# Patient Record
Sex: FEMALE | Race: WHITE | Employment: FULL TIME | URBAN - METROPOLITAN AREA
[De-identification: names, ages, dates, MRNs, and addresses within clinical notes are randomized per-mention and may not be internally consistent; named-entity substitution may affect disease eponyms.]

---

## 2019-07-10 ENCOUNTER — OFFICE VISIT (OUTPATIENT)
Dept: FAMILY MEDICINE CLINIC | Age: 44
End: 2019-07-10
Payer: COMMERCIAL

## 2019-07-10 VITALS
BODY MASS INDEX: 33.2 KG/M2 | TEMPERATURE: 97.5 F | WEIGHT: 194.5 LBS | HEIGHT: 64 IN | HEART RATE: 75 BPM | SYSTOLIC BLOOD PRESSURE: 118 MMHG | OXYGEN SATURATION: 97 % | DIASTOLIC BLOOD PRESSURE: 68 MMHG

## 2019-07-10 DIAGNOSIS — M54.50 CHRONIC RIGHT-SIDED LOW BACK PAIN WITHOUT SCIATICA: Primary | ICD-10-CM

## 2019-07-10 DIAGNOSIS — Z13.31 POSITIVE DEPRESSION SCREENING: ICD-10-CM

## 2019-07-10 DIAGNOSIS — R32 URINARY INCONTINENCE, UNSPECIFIED TYPE: ICD-10-CM

## 2019-07-10 DIAGNOSIS — H61.21 IMPACTED CERUMEN, RIGHT EAR: ICD-10-CM

## 2019-07-10 DIAGNOSIS — F32.A DEPRESSION, UNSPECIFIED DEPRESSION TYPE: ICD-10-CM

## 2019-07-10 DIAGNOSIS — G89.29 CHRONIC RIGHT-SIDED LOW BACK PAIN WITHOUT SCIATICA: Primary | ICD-10-CM

## 2019-07-10 PROCEDURE — 99204 OFFICE O/P NEW MOD 45 MIN: CPT | Performed by: NURSE PRACTITIONER

## 2019-07-10 PROCEDURE — G8431 POS CLIN DEPRES SCRN F/U DOC: HCPCS | Performed by: NURSE PRACTITIONER

## 2019-07-10 PROCEDURE — G0444 DEPRESSION SCREEN ANNUAL: HCPCS | Performed by: NURSE PRACTITIONER

## 2019-07-10 PROCEDURE — 96372 THER/PROPH/DIAG INJ SC/IM: CPT | Performed by: NURSE PRACTITIONER

## 2019-07-10 RX ORDER — PREDNISONE 10 MG/1
10 TABLET ORAL
Qty: 15 TABLET | Refills: 0 | Status: SHIPPED | OUTPATIENT
Start: 2019-07-10 | End: 2019-07-15

## 2019-07-10 RX ORDER — CYCLOBENZAPRINE HCL 5 MG
5 TABLET ORAL 3 TIMES DAILY PRN
Qty: 10 TABLET | Refills: 0 | Status: SHIPPED | OUTPATIENT
Start: 2019-07-10 | End: 2019-07-20

## 2019-07-10 RX ORDER — KETOROLAC TROMETHAMINE 30 MG/ML
60 INJECTION, SOLUTION INTRAMUSCULAR; INTRAVENOUS ONCE
Status: COMPLETED | OUTPATIENT
Start: 2019-07-10 | End: 2019-07-10

## 2019-07-10 RX ORDER — LEVOTHYROXINE SODIUM 0.2 MG/1
200 TABLET ORAL DAILY
COMMUNITY
End: 2019-08-01 | Stop reason: SDUPTHER

## 2019-07-10 RX ADMIN — KETOROLAC TROMETHAMINE 60 MG: 30 INJECTION, SOLUTION INTRAMUSCULAR; INTRAVENOUS at 15:12

## 2019-07-10 ASSESSMENT — PATIENT HEALTH QUESTIONNAIRE - PHQ9
8. MOVING OR SPEAKING SO SLOWLY THAT OTHER PEOPLE COULD HAVE NOTICED. OR THE OPPOSITE, BEING SO FIGETY OR RESTLESS THAT YOU HAVE BEEN MOVING AROUND A LOT MORE THAN USUAL: 3
6. FEELING BAD ABOUT YOURSELF - OR THAT YOU ARE A FAILURE OR HAVE LET YOURSELF OR YOUR FAMILY DOWN: 0
2. FEELING DOWN, DEPRESSED OR HOPELESS: 2
SUM OF ALL RESPONSES TO PHQ9 QUESTIONS 1 & 2: 4
1. LITTLE INTEREST OR PLEASURE IN DOING THINGS: 2
4. FEELING TIRED OR HAVING LITTLE ENERGY: 3
SUM OF ALL RESPONSES TO PHQ QUESTIONS 1-9: 16
10. IF YOU CHECKED OFF ANY PROBLEMS, HOW DIFFICULT HAVE THESE PROBLEMS MADE IT FOR YOU TO DO YOUR WORK, TAKE CARE OF THINGS AT HOME, OR GET ALONG WITH OTHER PEOPLE: 1
9. THOUGHTS THAT YOU WOULD BE BETTER OFF DEAD, OR OF HURTING YOURSELF: 0
5. POOR APPETITE OR OVEREATING: 0
SUM OF ALL RESPONSES TO PHQ QUESTIONS 1-9: 16
3. TROUBLE FALLING OR STAYING ASLEEP: 3
7. TROUBLE CONCENTRATING ON THINGS, SUCH AS READING THE NEWSPAPER OR WATCHING TELEVISION: 3

## 2019-07-10 NOTE — PROGRESS NOTES
Debrox drops 2 times daily      Return in about 1 month (around 8/10/2019) for chronic right sided back pain. 1.  Shilpa Mckeon received counseling on the following healthy behaviors: nutrition, exercise and medication adherence  2. Patient given educational materials - see patient instructions  3. Was a self-tracking handout given in paper form or via IQumulushart? No  If yes, see orders or list here. 4.  Discussed use, benefit, and side effects of prescribed medications. Barriers to medication compliance addressed. All patient questions answered. Pt voiced understanding. 5.  Reviewed prior labs, imaging, consultation, follow up, and health maintenance  6. Continue current medications, diet and exercise. 7. Discussed use, benefit, and side effects of prescribed medications. Barriers to medication compliance addressed. All her questions were answered. Pt voiced understanding. Shilpa Mckeon will continue current medications, diet and exercise. Patient given educational materials on back exercises/stretches    Of the 45 minute duration appointment visit, Breana Danielson CNP spent at least 50% of the face-to-face time in counseling, explanation of diagnosis, planning of further management, and answering all questions. Signed:  Breana Danielson CNP    This note is created with the assistance of a speech-recognition program.  While intending to generate a document that actually reflects the content of the visit, no guarantees can be provided that every mistake has been identified and corrected by editing.

## 2019-07-15 DIAGNOSIS — F32.A DEPRESSION, UNSPECIFIED DEPRESSION TYPE: Primary | ICD-10-CM

## 2019-07-15 DIAGNOSIS — Z13.31 POSITIVE DEPRESSION SCREENING: ICD-10-CM

## 2019-08-01 DIAGNOSIS — F32.A DEPRESSION, UNSPECIFIED DEPRESSION TYPE: ICD-10-CM

## 2019-08-01 DIAGNOSIS — Z13.220 SCREENING CHOLESTEROL LEVEL: ICD-10-CM

## 2019-08-01 DIAGNOSIS — E03.9 HYPOTHYROIDISM, UNSPECIFIED TYPE: Primary | ICD-10-CM

## 2019-08-02 RX ORDER — LEVOTHYROXINE SODIUM 0.2 MG/1
200 TABLET ORAL DAILY
Qty: 30 TABLET | Refills: 3 | Status: SHIPPED | OUTPATIENT
Start: 2019-08-02 | End: 2019-10-19 | Stop reason: DRUGHIGH

## 2019-10-12 LAB
ALBUMIN SERPL-MCNC: 4 G/DL
ALP BLD-CCNC: 83 U/L
ALT SERPL-CCNC: 11 U/L
ANION GAP SERPL CALCULATED.3IONS-SCNC: 9 MMOL/L
AST SERPL-CCNC: 12 U/L
BASOPHILS ABSOLUTE: NORMAL /ΜL
BASOPHILS RELATIVE PERCENT: NORMAL %
BILIRUB SERPL-MCNC: 0.6 MG/DL (ref 0.1–1.4)
BUN BLDV-MCNC: 14 MG/DL
CALCIUM SERPL-MCNC: 8.9 MG/DL
CHLORIDE BLD-SCNC: 108 MMOL/L
CHOLESTEROL, FASTING: 175
CO2: 24 MMOL/L
CREAT SERPL-MCNC: 0.88 MG/DL
EOSINOPHILS ABSOLUTE: NORMAL /ΜL
EOSINOPHILS RELATIVE PERCENT: NORMAL %
GFR CALCULATED: >60
GLUCOSE BLD-MCNC: 94 MG/DL
HCT VFR BLD CALC: 41.2 % (ref 36–46)
HDLC SERPL-MCNC: 34 MG/DL (ref 35–70)
HEMOGLOBIN: 13.8 G/DL (ref 12–16)
LDL CHOLESTEROL CALCULATED: 108 MG/DL (ref 0–160)
LYMPHOCYTES ABSOLUTE: NORMAL /ΜL
LYMPHOCYTES RELATIVE PERCENT: NORMAL %
MCH RBC QN AUTO: 30.2 PG
MCHC RBC AUTO-ENTMCNC: 33.6 G/DL
MCV RBC AUTO: 90 FL
MONOCYTES ABSOLUTE: NORMAL /ΜL
MONOCYTES RELATIVE PERCENT: NORMAL %
NEUTROPHILS ABSOLUTE: NORMAL /ΜL
NEUTROPHILS RELATIVE PERCENT: NORMAL %
PLATELET # BLD: 194 K/ΜL
PMV BLD AUTO: 7.6 FL
POTASSIUM SERPL-SCNC: 4.1 MMOL/L
RBC # BLD: 4.58 10^6/ΜL
SODIUM BLD-SCNC: 141 MMOL/L
T4 FREE: 1.76
TOTAL PROTEIN: 6.8
TRIGLYCERIDE, FASTING: 167
TSH SERPL DL<=0.05 MIU/L-ACNC: 0.06 UIU/ML
WBC # BLD: 3.8 10^3/ML

## 2019-10-19 DIAGNOSIS — F32.A DEPRESSION, UNSPECIFIED DEPRESSION TYPE: ICD-10-CM

## 2019-10-19 DIAGNOSIS — E03.9 HYPOTHYROIDISM, UNSPECIFIED TYPE: Primary | ICD-10-CM

## 2019-10-19 DIAGNOSIS — Z13.220 SCREENING CHOLESTEROL LEVEL: ICD-10-CM

## 2019-10-19 DIAGNOSIS — E03.9 HYPOTHYROIDISM, UNSPECIFIED TYPE: ICD-10-CM

## 2019-10-19 RX ORDER — LEVOTHYROXINE SODIUM 175 UG/1
175 TABLET ORAL DAILY
Qty: 30 TABLET | Refills: 0 | Status: SHIPPED | OUTPATIENT
Start: 2019-10-19 | End: 2020-04-06 | Stop reason: SDUPTHER

## 2020-04-06 RX ORDER — LEVOTHYROXINE SODIUM 0.2 MG/1
TABLET ORAL
Qty: 30 TABLET | Refills: 0 | OUTPATIENT
Start: 2020-04-06

## 2020-04-06 RX ORDER — LEVOTHYROXINE SODIUM 175 UG/1
175 TABLET ORAL DAILY
Qty: 30 TABLET | Refills: 0 | Status: SHIPPED | OUTPATIENT
Start: 2020-04-06 | End: 2020-05-04

## 2020-06-01 ENCOUNTER — HOSPITAL ENCOUNTER (OUTPATIENT)
Age: 45
Setting detail: SPECIMEN
Discharge: HOME | End: 2020-06-01
Payer: COMMERCIAL

## 2020-06-01 LAB
THYROXINE, FREE: 1.77 NG/DL (ref 0.93–1.7)
TSH SERPL DL<=0.05 MIU/L-ACNC: 0.27 MIU/L (ref 0.3–5)

## 2020-06-01 RX ORDER — LEVOTHYROXINE SODIUM 0.15 MG/1
150 TABLET ORAL DAILY
Qty: 30 TABLET | Refills: 1 | Status: SHIPPED | OUTPATIENT
Start: 2020-06-01 | End: 2020-10-27

## 2020-10-22 RX ORDER — LEVOTHYROXINE SODIUM 0.15 MG/1
TABLET ORAL
Qty: 30 TABLET | Refills: 0 | OUTPATIENT
Start: 2020-10-22

## 2020-10-28 ENCOUNTER — HOSPITAL ENCOUNTER (OUTPATIENT)
Age: 45
Discharge: HOME | End: 2020-10-28
Payer: COMMERCIAL

## 2020-10-28 LAB
THYROXINE, FREE: 0.95 NG/DL (ref 0.93–1.7)
TSH SERPL DL<=0.05 MIU/L-ACNC: 69.15 MIU/L (ref 0.3–5)

## 2020-10-28 PROCEDURE — 84443 ASSAY THYROID STIM HORMONE: CPT

## 2020-10-28 PROCEDURE — 84439 ASSAY OF FREE THYROXINE: CPT

## 2020-11-03 ENCOUNTER — OFFICE VISIT (OUTPATIENT)
Dept: FAMILY MEDICINE CLINIC | Age: 45
End: 2020-11-03
Payer: COMMERCIAL

## 2020-11-03 VITALS
BODY MASS INDEX: 33.46 KG/M2 | SYSTOLIC BLOOD PRESSURE: 110 MMHG | TEMPERATURE: 97.4 F | OXYGEN SATURATION: 95 % | DIASTOLIC BLOOD PRESSURE: 70 MMHG | WEIGHT: 196 LBS | HEIGHT: 64 IN | HEART RATE: 78 BPM

## 2020-11-03 PROCEDURE — G0444 DEPRESSION SCREEN ANNUAL: HCPCS | Performed by: NURSE PRACTITIONER

## 2020-11-03 PROCEDURE — 99396 PREV VISIT EST AGE 40-64: CPT | Performed by: NURSE PRACTITIONER

## 2020-11-03 RX ORDER — LEVOTHYROXINE SODIUM 175 UG/1
175 TABLET ORAL DAILY
Qty: 30 TABLET | Refills: 1 | Status: SHIPPED | OUTPATIENT
Start: 2020-11-03 | End: 2021-02-15 | Stop reason: SDUPTHER

## 2020-11-03 RX ORDER — FLUOXETINE HYDROCHLORIDE 40 MG/1
40 CAPSULE ORAL DAILY
COMMUNITY

## 2020-11-03 RX ORDER — BUSPIRONE HYDROCHLORIDE 10 MG/1
10 TABLET ORAL 3 TIMES DAILY
COMMUNITY

## 2020-11-03 SDOH — ECONOMIC STABILITY: INCOME INSECURITY: HOW HARD IS IT FOR YOU TO PAY FOR THE VERY BASICS LIKE FOOD, HOUSING, MEDICAL CARE, AND HEATING?: NOT HARD AT ALL

## 2020-11-03 SDOH — ECONOMIC STABILITY: FOOD INSECURITY: WITHIN THE PAST 12 MONTHS, THE FOOD YOU BOUGHT JUST DIDN'T LAST AND YOU DIDN'T HAVE MONEY TO GET MORE.: NEVER TRUE

## 2020-11-03 SDOH — ECONOMIC STABILITY: FOOD INSECURITY: WITHIN THE PAST 12 MONTHS, YOU WORRIED THAT YOUR FOOD WOULD RUN OUT BEFORE YOU GOT MONEY TO BUY MORE.: NEVER TRUE

## 2020-11-03 ASSESSMENT — PATIENT HEALTH QUESTIONNAIRE - PHQ9
1. LITTLE INTEREST OR PLEASURE IN DOING THINGS: 0
2. FEELING DOWN, DEPRESSED OR HOPELESS: 0
SUM OF ALL RESPONSES TO PHQ QUESTIONS 1-9: 0
SUM OF ALL RESPONSES TO PHQ9 QUESTIONS 1 & 2: 0

## 2020-11-03 NOTE — PROGRESS NOTES
CHUY Arnold-C  [address]  [id number] [facility]. Sara Walker  [address]  [phone]  [phone]    Dahiana Lamar is a 39 y.o. female who is here with c/o of:    Chief Complaint: Hypothyroidism      Patient Accompanied by: n/a    HPI - Dahiana Lamar is here today for well adult exam and f/u of chronic conditions    Anxiety   - patient is under the care of provider at [facility] for anxiety   - she reports her anxiety is controlled at this time   - she denies feeling down, depressed, or hopeless and no thoughts of harming herself   - current meds: buspar 10mg tid, Prozac 40mg daily and denies any adverse affects of the medications    Hypothyroid   - she denies fatigue, unintentional weight loss/gain, throat swelling/mass, difficulty swallowing, heat/cold intolerance, hair loss   - she is taking levothyroxine 175mcg daily and does not miss doses    HM  Dr. Anna Metz (gynecology) for Pap -    There is no problem list on file for this patient. Past Medical History:   Diagnosis Date    Allergic rhinitis     Anxiety     Depression     Hypothyroid     Irritable bowel syndrome       Past Surgical History:   Procedure Laterality Date    ENDOMETRIAL ABLATION      HYSTERECTOMY      OTHER SURGICAL HISTORY Right 08/09/2016    excision of axillary soft tissue mass with biopsy    THYROID SURGERY      benign mass    WRIST SURGERY      rt wrist tendon repair     Family History   Problem Relation Age of Onset    Diabetes Mother     Heart Disease Mother     High Blood Pressure Mother     High Cholesterol Mother     Depression Mother     High Blood Pressure Father     High Cholesterol Father     Alcohol Abuse Father     Asthma Father     High Blood Pressure Brother      Social History     Tobacco Use    Smoking status: Never Smoker    Smokeless tobacco: Never Used   Substance Use Topics    Alcohol use:  Yes     ALLERGIES:    Allergies   Allergen Reactions    Percocet [Oxycodone-Acetaminophen]           Subjective     · Constitutional:  Negative for activity change, appetite change,unexpected weight change, chills, fever, and fatigue. · HENT: Negative for ear pain, sore throat,  Rhinorrhea, sinus pain, sinus pressure, congestion. · Eyes:  Negative for pain and discharge. · Respiratory:  Negative for chest tightness, shortness of breath, wheezing, and cough. · Cardiovascular:  Negative for chest pain, palpitations and leg swelling. · Gastrointestinal: Negative for abdominal pain, blood in stool, constipation,diarrhea, nausea and vomiting. · Endocrine: Negative for cold intolerance, heat intolerance, polydipsia, polyphagia and polyuria. · Genitourinary: Negative for difficulty urinating, dysuria, flank pain, frequency, hematuria and urgency. · Musculoskeletal: Negative for arthralgias, back pain, joint swelling, myalgias, neck pain and neck stiffness. · Skin: Negative for rash and wound. · Allergic/Immunologic: Negative for environmental allergies and food allergies. · Neurological:  Negative for dizziness, light-headedness, numbness and headaches. · Hematological:  Negative for adenopathy. Does not bruise/bleed easily. · Psychiatric/Behavioral: Negative for self-injury, sleep disturbance and suicidal ideas. Positive for anxiety    Objective     PHYSICAL EXAM:   · Constitutional: Mendel Singleton is oriented to person, place, and time. Vital signs are normal. Appears well-developed and well-nourished. · HEENT:   · Head: Normocephalic and atraumatic. Right Ear: Hearing and external ear normal. TM normal  Canal normal  · Left Ear: Hearing and external ear normal. TM normal Canal normal  · Eyes:PERRL, EOMI, Conjunctiva normal, No discharge. · Neck: Full passive range of motion. Non-tender on palpation. Neck supple. No thyromegaly present.  Trachea normal.  · Cardiovascular: Normal rate, regular rhythm, S1, S2, no murmur, no gallop, no friction rub, intact distal pulses. · Pulmonary/Chest: Breath sounds are clear throughout, No respiratory distress, No wheezing, No chest tenderness. Effort normal  · Musculoskeletal: Extremities appear regular and symmetric. No evident masses, lesions, foreign bodies, or other abnormalities. No edema. No tenderness on palpation. Joints are stable. Full ROM, strength and tone are within normal limits. · Lymphadenopathy: No lymphadenopathy noted. · Neurological: Alert and oriented to person, place, and time. Normal motor function, Normal sensory function, No focal deficits noted. He has normal strength. · Skin: Skin is warm, dry and intact. No obvious lesions on exposed skin  · Psychiatric: Normal mood and affect. Speech is normal and behavior is normal.     Nursing note and vitals reviewed. Blood pressure 110/70, pulse 78, temperature 97.4 °F (36.3 °C), temperature source Temporal, height 5' 4\" (1.626 m), weight 196 lb (88.9 kg), SpO2 95 %. Body mass index is 33.64 kg/m². Wt Readings from Last 3 Encounters:   11/03/20 196 lb (88.9 kg)   07/10/19 194 lb 8 oz (88.2 kg)   08/09/16 186 lb (84.4 kg)     BP Readings from Last 3 Encounters:   11/03/20 110/70   07/10/19 118/68   08/09/16 110/75       No results found for this visit on 11/03/20. Completed Orders/Prescriptions   Orders Placed This Encounter   Medications    levothyroxine (SYNTHROID) 175 MCG tablet     Sig: Take 1 tablet by mouth Daily     Dispense:  30 tablet     Refill:  1               AssessmentPlan/Medical Decision Making     1. Well adult exam    2. Hypothyroidism, unspecified type  - levothyroxine (SYNTHROID) 175 MCG tablet; Take 1 tablet by mouth Daily  Dispense: 30 tablet; Refill: 1  - TSH; Future  - T4, Free; Future  - CBC With Auto Differential; Future  - Comprehensive Metabolic Panel; Future    3. Anxiety  - continue medications/f/u with [facility] as scheduled  - CBC With Auto Differential; Future  - Comprehensive Metabolic Panel; Future    4.  Depression screening negative  - [place] DEPRESSION SCREEN ANNUAL  PHQ-9 Total Score: 0 (11/3/2020  8:32 AM)        Return in about 1 year (around 11/3/2021). 1.  Fredrick New received counseling on the following healthy behaviors: nutrition, exercise and medication adherence  2. Patient given educational materials - see patient instructions  3. Was a self-tracking handout given in paper form or via Rhapsodyhart? No  If yes, see orders or list here. 4.  Discussed use, benefit, and side effects of prescribed medications. Barriers to medication compliance addressed. All patient questions answered. Pt voiced understanding. 5.  Reviewed prior labs, imaging, consultation, follow up, and health maintenance  6. Continue current medications, diet and exercise. 7. Discussed use, benefit, and side effects of prescribed medications. Barriers to medication compliance addressed. All her questions were answered. Pt voiced understanding. Fredrick New will continue current medications, diet and exercise. Of the 25 minute duration appointment visit, Summer Bradford CNP spent at least 50% of the face-to-face time in counseling, explanation of diagnosis, planning of further management, and answering all questions. Signed:  Summer Bradford CNP    This note is created with the assistance of a speech-recognition program.  While intending to generate a document that actually reflects the content of the visit, no guarantees can be provided that every mistake has been identified and corrected by editing.

## 2021-02-10 ENCOUNTER — HOSPITAL ENCOUNTER (OUTPATIENT)
Age: 46
Setting detail: SPECIMEN
Discharge: HOME | End: 2021-02-10
Payer: COMMERCIAL

## 2021-02-10 DIAGNOSIS — Z13.220 SCREENING CHOLESTEROL LEVEL: ICD-10-CM

## 2021-02-10 DIAGNOSIS — F41.9 ANXIETY: ICD-10-CM

## 2021-02-10 DIAGNOSIS — E03.9 HYPOTHYROIDISM, UNSPECIFIED TYPE: ICD-10-CM

## 2021-02-10 LAB
ABSOLUTE EOS #: 0.06 K/UL (ref 0–0.44)
ABSOLUTE IMMATURE GRANULOCYTE: 0.03 K/UL (ref 0–0.3)
ABSOLUTE LYMPH #: 1.82 K/UL (ref 1.1–3.7)
ABSOLUTE MONO #: 0.36 K/UL (ref 0.1–1.2)
ALBUMIN SERPL-MCNC: 4 G/DL (ref 3.5–5.2)
ALBUMIN/GLOBULIN RATIO: 1.3 (ref 1–2.5)
ALP BLD-CCNC: 70 U/L (ref 35–104)
ALT SERPL-CCNC: 11 U/L (ref 5–33)
ANION GAP SERPL CALCULATED.3IONS-SCNC: 12 MMOL/L (ref 9–17)
AST SERPL-CCNC: 15 U/L
BASOPHILS # BLD: 0 % (ref 0–2)
BASOPHILS ABSOLUTE: <0.03 K/UL (ref 0–0.2)
BILIRUB SERPL-MCNC: 0.35 MG/DL (ref 0.3–1.2)
BUN BLDV-MCNC: 17 MG/DL (ref 6–20)
BUN/CREAT BLD: ABNORMAL (ref 9–20)
CALCIUM SERPL-MCNC: 9.3 MG/DL (ref 8.6–10.4)
CHLORIDE BLD-SCNC: 108 MMOL/L (ref 98–107)
CHOLESTEROL, FASTING: 200 MG/DL
CHOLESTEROL/HDL RATIO: 4.8
CO2: 20 MMOL/L (ref 20–31)
CREAT SERPL-MCNC: 0.79 MG/DL (ref 0.5–0.9)
DIFFERENTIAL TYPE: ABNORMAL
EOSINOPHILS RELATIVE PERCENT: 1 % (ref 1–4)
GFR AFRICAN AMERICAN: >60 ML/MIN
GFR NON-AFRICAN AMERICAN: >60 ML/MIN
GFR SERPL CREATININE-BSD FRML MDRD: ABNORMAL ML/MIN/{1.73_M2}
GFR SERPL CREATININE-BSD FRML MDRD: ABNORMAL ML/MIN/{1.73_M2}
GLUCOSE BLD-MCNC: 103 MG/DL (ref 70–99)
HCT VFR BLD CALC: 44.3 % (ref 36.3–47.1)
HDLC SERPL-MCNC: 42 MG/DL
HEMOGLOBIN: 14.1 G/DL (ref 11.9–15.1)
IMMATURE GRANULOCYTES: 1 %
LDL CHOLESTEROL: 114 MG/DL (ref 0–130)
LYMPHOCYTES # BLD: 30 % (ref 24–43)
MCH RBC QN AUTO: 29.4 PG (ref 25.2–33.5)
MCHC RBC AUTO-ENTMCNC: 31.8 G/DL (ref 28.4–34.8)
MCV RBC AUTO: 92.3 FL (ref 82.6–102.9)
MONOCYTES # BLD: 6 % (ref 3–12)
NRBC AUTOMATED: 0 PER 100 WBC
PDW BLD-RTO: 12.7 % (ref 11.8–14.4)
PLATELET # BLD: 197 K/UL (ref 138–453)
PLATELET ESTIMATE: ABNORMAL
PMV BLD AUTO: 9.3 FL (ref 8.1–13.5)
POTASSIUM SERPL-SCNC: 4.6 MMOL/L (ref 3.7–5.3)
RBC # BLD: 4.8 M/UL (ref 3.95–5.11)
RBC # BLD: ABNORMAL 10*6/UL
SEG NEUTROPHILS: 62 % (ref 36–65)
SEGMENTED NEUTROPHILS ABSOLUTE COUNT: 3.79 K/UL (ref 1.5–8.1)
SODIUM BLD-SCNC: 140 MMOL/L (ref 135–144)
THYROXINE, FREE: 0.96 NG/DL (ref 0.93–1.7)
TOTAL PROTEIN: 7.1 G/DL (ref 6.4–8.3)
TRIGLYCERIDE, FASTING: 222 MG/DL
TSH SERPL DL<=0.05 MIU/L-ACNC: 2.12 MIU/L (ref 0.3–5)
VLDLC SERPL CALC-MCNC: ABNORMAL MG/DL (ref 1–30)
WBC # BLD: 6.1 K/UL (ref 3.5–11.3)
WBC # BLD: ABNORMAL 10*3/UL

## 2021-02-15 DIAGNOSIS — E03.9 HYPOTHYROIDISM, UNSPECIFIED TYPE: ICD-10-CM

## 2021-02-15 RX ORDER — LEVOTHYROXINE SODIUM 175 UG/1
175 TABLET ORAL DAILY
Qty: 30 TABLET | Refills: 11 | Status: SHIPPED | OUTPATIENT
Start: 2021-02-15 | End: 2021-02-18 | Stop reason: SDUPTHER

## 2021-02-15 NOTE — TELEPHONE ENCOUNTER
Екатерина Dunne is calling to request a refill on the following medication(s):    Medication Request:  Requested Prescriptions     Pending Prescriptions Disp Refills    levothyroxine (SYNTHROID) 175 MCG tablet 30 tablet 1     Sig: Take 1 tablet by mouth Daily       Last Visit Date (If Applicable):  97/0/3506    Next Visit Date:    Visit date not found

## 2021-02-18 ENCOUNTER — TELEPHONE (OUTPATIENT)
Dept: FAMILY MEDICINE CLINIC | Age: 46
End: 2021-02-18

## 2021-02-18 DIAGNOSIS — E03.9 HYPOTHYROIDISM, UNSPECIFIED TYPE: ICD-10-CM

## 2021-02-18 RX ORDER — LEVOTHYROXINE SODIUM 175 UG/1
175 TABLET ORAL DAILY
Qty: 30 TABLET | Refills: 11 | Status: SHIPPED | OUTPATIENT
Start: 2021-02-18 | End: 2022-04-21

## 2021-02-18 NOTE — TELEPHONE ENCOUNTER
Will you resend the levothyroxine to [facility] that you sent to Naomi on 2/15/21.   Pt does not go to [facility] anymore, I have removed from her pharmacy list

## 2021-07-06 ENCOUNTER — TELEPHONE (OUTPATIENT)
Dept: FAMILY MEDICINE CLINIC | Age: 46
End: 2021-07-06

## 2021-07-06 DIAGNOSIS — M25.511 ACUTE PAIN OF RIGHT SHOULDER: Primary | ICD-10-CM

## 2021-07-07 NOTE — TELEPHONE ENCOUNTER
Naproxen and prednisone are appropriate treatment. This is not an emergency and unfortunately, I do not have any sooner appointments. I will need to see her prior to any further orders and an MRI is unlikely as she will need to complete 6 weeks of physical therapy for her insurance to cover the imaging. I will place the order for physical therapy and highly recommend she start as soon as possible.

## 2021-07-09 ENCOUNTER — HOSPITAL ENCOUNTER (OUTPATIENT)
Dept: PHYSICAL THERAPY | Facility: CLINIC | Age: 46
Setting detail: THERAPIES SERIES
Discharge: HOME | End: 2021-07-09
Payer: COMMERCIAL

## 2021-07-09 PROCEDURE — 97110 THERAPEUTIC EXERCISES: CPT

## 2021-07-09 PROCEDURE — 97161 PT EVAL LOW COMPLEX 20 MIN: CPT

## 2021-07-09 NOTE — CONSULTS
[] [facility]) - [facility] &  Therapy  [address]  [phone]  F: [phone] [x] [address]  [facility]   [address]  P: [phone]  F: [phone] [] [address] &  Therapy  [address]  P: [phone]  F: [phone] [] [address]  P: [phone]  F: [phone] [] [address]  [facility]   [address]   [place]: [phone]  F: [phone]      Physical Therapy Upper Extremity Evaluation    Date:  2021  Patient: Pam Chung  : 1975  MRN: [id number]  Physician: Michael Gonzalez Ave: Melo  Medical Diagnosis: M25.511- acute pain of right shoulder  Rehab Codes: M25.511; M75.102; M62.81   Onset Date: 2021   Next 's appt: 2021     Subjective:   CC:     Reports gradual onset of R shoulder pain without specific SAMANTHA or aggravating factors   New onset of pain and symptoms; denies injury, surgery to L shoulder   Visited urgent care approximately x1 week ago- issued steroid pack and naproxen and provided PT prescription  Reports plans to visit Cintia Graves for first time on 2021  Underwent second COVID-19 vaccine within R UE in April, just prior to start of pain and symptoms      HPI: 2021; see above     PMHx: [x] Unremarkable [] Diabetes [] HTN  [] Pacemaker   [] MI/Heart Problems [] Cancer [x] Arthritis [x] Other: gland problems; anxiety; stomach problems; depression. [x] Refer to full medical chart  In EPIC     Comorbidities: NONE  [] Obesity [] Dialysis  [] N/A   [] Asthma/COPD [] Dementia [] Other:   [] Stroke [] Sleep apnea [] Other:   [] Vascular disease [] Rheumatic disease [] Other:     Tests: [x] X-Ray: [] MRI:  [] Other: nothing significant.      Medications: [x] Refer to full medical record [] None [] Other: see above. Allergies:      [x] Refer to full medical record [] None [] Other:    Function:  Hand Dominance  [] Right  [x] Left  [employer]   Job Status [x]  Normal duty   [] Light duty   [] Off due to condition    []  Retired   [] Not employed   [] Disability  [] Other:  []  Return to work: reports inc use of L UE   Work activities/duties Teacher     Pain:  [x] Yes  [] No Location: ache at rest- anterior-lateral R shoulder; unclear muscle VS joint pain Pain Rating: (0-10 scale) 5-10/10 over this past week   Pain altered Tx:  [] Yes  [x] No  Action: N/A  Symptoms:  [] Improving [x] Worsening [x] Same  Better:  [] AM    [] PM    [] Sit    [] Rise/Sit    []Stand    [] Walk    [] Lying    [] Other: medications; fleeting relief with HP/CP. Worse: [] AM    [] PM    [] Sit    [] Rise/Sit    []Stand    [] Walk    [] Lying    [] Bend                      [] Valsalva    [x] Other: \"raise my hand a certain way; can't itch my back- can't turn it. \"  Sleep: [] OK    [x] Disturbed- inc waking due to pain, but able to reposition and fall back asleep. R hand/fingers tingling- a few times each day. Denies R shoulder clicking, catching. Denies ecchymosis, edema near start of pain.      Objective:     ROM  °A/P END FEEL STRENGTH TESTS (+/-) Left Right Not Tested    Left Right  Left Right Drop Arm - - []   Sit Shld Flex 170- A 125 deg- A; P; 170 deg- P  4+ 4; P Sulcus Sign   [x]   Sit Shld Abd 175- A 80 deg- A, P begins; able to progress to above 125 deg, but P continues; 150-160 deg- P; limited by P  4+ 4; P Apprehension - - []   Sit Shld IR    4+ 4-; P Yergasons   [x]   Shoulder Flex      Speeds   [x]   Ext      Neer Unable Unable []   ABD      Gilbert  - + []   ER @ 0 45 90 70- A/P; @ 45 deg 65- A/P; @ 45 deg; P  4+ @ 0 deg  4- @ 0 deg; P Painful Arc - - []   IR 70- A/P; @ 45 deg 70- A/P; @ 45 deg    Tinel   [x]   Supraspinatus      Chantale - +; P- no stiffness     Infraspinatus Empty Can - +; P with empty- did not apply OP    Serratus Ant      IR behind the back T8 T12; P    Pectoralis            Lats            Mid Trap            Lower Trap              R shoulder PROM limited by apprehension and P, not inc tissue tension or firm end-feel. OBSERVATION No Deficit Deficit Not Tested Comments   Forward Head [] [x] []    Rounded Shoulders [] [x] []    Kyphosis [x] [] []    Scapular Height/Position [x] [] []    Winging [x] [] []    Scapulohumeral Rhythm [] [x] [] Limited d/t limited R shoulder AROM FLEX/ABD. INSPECTION/PALPATION    TTP anterior, middle deltoids; biceps; no inc tissue tension. SC/AC Joint [x] [] []    Supraspinatus [x] [] []    Biceps tendon/groove [x] [] []    Posterior shld [] [x] []    Subscapularis [x] [] []      Functional Test: UEFS Score: 44% functionally impaired    [45/80]      Assessment:    Patient is a 39 y.o. pleasant female who presents to physical therapy initial evaluation with signs and symptoms consistent with potential R shoulder RTC tendinitis. Patient demonstrates impairments and symptoms as detailed below in therapy goals. Patient currently limited in sleeping upon R side, reaching and lifting with R UE secondary to these impairments and increased symptoms. Patient would benefit from continued physical therapy services in order to address these impairments and symptoms, and return to QOL, ADLs, work. Anticipated frequency detailed above. Prognosis limited secondary to sig pain and limitation at initial evaluation- justifying a low complexity evaluation. If unable to achieve significant improvements within six to twelve visits, will consult referring physician and consider a follow-up visit with said physician. Patient verbalized understanding and agreement to all aforementioned statements.      Patient would benefit from skilled physical therapy services in order to: inc R shoulder A/PROM; R shoulder gross strength; and overall improve tolerance Activity  08702 [x] Vasopneumatic cold with compression  53058    [] Gait Training   [id number] [] Ultrasound   [id number]   [] Neuromuscular Re-education  [id number] [] Electrical Stimulation Unattended  51882   [x] Manual Therapy  20204 [] Electrical Stimulation Attended  [id number]   [x] Instruction in HEP  [] Lumbar/Cervical Traction  [id number]   [] Aquatic Therapy   90032 [x] Cold/hotpack    [] Massage   01693      [] Dry Needling, 1 or 2 muscles  46300   [] Biofeedback, first 15 minutes   11904  [] Biofeedback, additional 15 minutes   77475 [] Dry Needling, 3 or more muscles  82471     []  Medication allergies reviewed for use of    Dexamethasone Sodium Phosphate 4mg/ml     with iontophoresis treatments. Pt is not allergic. Frequency: 2 x/week for 12 visits    Todays Treatment:  Modalities: vaso to R shoulder- start next visit  Precautions: potential R shoulder RTC tendinitis- may have been brought on secondary to COVID-19 vaccine   Exercises: PT services required prior to insurance auth for MRI  Exercise Reps/ Time Weight/ Level Comments   Supine wand AAROM FLEX, ER/IR x10, 3\" ea  HEP  Towel roll supporting humerus for ER/IR  Instructed to only approach pain    Supine wand chest press x10   HEP   Seated scapular retractions    HEP   Seated table-slides scaption    HEP                                 Other:    Specific Instructions for next treatment: Review all previously issued HEP interventions- continue if proven beneficial or modify PRN. Consider STM R UT; R shoulder PROM with distraction; supine SA punches; and seated SB roll-outs next visit.      Evaluation Complexity:  History (Personal factors, comorbidities) [x] 0 [] 1-2 [] 3+   Exam (limitations, restrictions) [] 1-2 [x] 3 [] 4+   Clinical presentation (progression) [x] Stable [] Evolving  [] Unstable   Decision Making [x] Low [] Moderate [] High    [x] Low Complexity [] Moderate Complexity [] High Complexity     Treatment Charges: Mins Units   [x] Evaluation [x]  Low       []  Moderate       []  High 38 1   []  Modalities     [x]  Ther Exercise 10 1   []  Manual Therapy     []  Ther Activities     []  Aquatics     []  Vasocompression     []  Other       TOTAL TREATMENT TIME: 48    Time in: 1:02PM    Time Out: 1:50PM    Electronically signed by: Guera Turpin PT

## 2021-07-12 ENCOUNTER — OFFICE VISIT (OUTPATIENT)
Dept: FAMILY MEDICINE CLINIC | Age: 46
End: 2021-07-12
Payer: COMMERCIAL

## 2021-07-12 VITALS
SYSTOLIC BLOOD PRESSURE: 126 MMHG | BODY MASS INDEX: 32.1 KG/M2 | WEIGHT: 187 LBS | HEART RATE: 73 BPM | OXYGEN SATURATION: 97 % | TEMPERATURE: 97.2 F | DIASTOLIC BLOOD PRESSURE: 82 MMHG

## 2021-07-12 DIAGNOSIS — M25.511 ACUTE PAIN OF RIGHT SHOULDER: Primary | ICD-10-CM

## 2021-07-12 PROCEDURE — 99213 OFFICE O/P EST LOW 20 MIN: CPT | Performed by: NURSE PRACTITIONER

## 2021-07-12 RX ORDER — NAPROXEN 500 MG/1
500 TABLET ORAL 2 TIMES DAILY WITH MEALS
COMMUNITY

## 2021-07-12 ASSESSMENT — PATIENT HEALTH QUESTIONNAIRE - PHQ9
1. LITTLE INTEREST OR PLEASURE IN DOING THINGS: 0
SUM OF ALL RESPONSES TO PHQ QUESTIONS 1-9: 0
SUM OF ALL RESPONSES TO PHQ QUESTIONS 1-9: 0
SUM OF ALL RESPONSES TO PHQ9 QUESTIONS 1 & 2: 0
2. FEELING DOWN, DEPRESSED OR HOPELESS: 0
SUM OF ALL RESPONSES TO PHQ QUESTIONS 1-9: 0

## 2021-07-12 NOTE — PROGRESS NOTES
Imer Carter, CHUY-C  [address]  [id number] [facility]. [address]  [phone]  [phone]    Eugenio Van is a 39 y.o. female who is here with c/o of:    Chief Complaint: Shoulder Pain (right shoulder)      Patient Accompanied by: n/a    HPI - Eugenio Van is here today for the following:     Shoulder Pain   Pain location: entire right shoulder. This is a new problem. Episode onset: 2 months ago - reports about 1 week after she received the COVID vaccine. There has been no history of extremity trauma. The problem occurs constantly. The problem has been gradually worsening. The quality of the pain is described as aching. Pain severity now: moderate to severe. Associated symptoms include a limited range of motion and tingling (tingling in right hand and fingers). Pertinent negatives include no fever, joint locking, joint swelling, numbness or stiffness. The symptoms are aggravated by activity. Treatments tried: naproxen, given Rx for prednisone, but has not started d/t an error in the Rx from the . The treatment provided no relief. There is no problem list on file for this patient.     Past Medical History:   Diagnosis Date    Allergic rhinitis     Anxiety     Depression     Hypothyroid     Irritable bowel syndrome       Past Surgical History:   Procedure Laterality Date    ENDOMETRIAL ABLATION      HYSTERECTOMY      OTHER SURGICAL HISTORY Right 08/09/2016    excision of axillary soft tissue mass with biopsy    THYROID SURGERY      benign mass    WRIST SURGERY      rt wrist tendon repair     Family History   Problem Relation Age of Onset    Diabetes Mother     Heart Disease Mother     High Blood Pressure Mother     High Cholesterol Mother     Depression Mother     High Blood Pressure Father     High Cholesterol Father     Alcohol Abuse Father     Asthma Father     High Blood Pressure Brother      Social History     Tobacco Use    Smoking status: Never Smoker    Smokeless tobacco: Never Used   Substance Use Topics    Alcohol use: Yes     ALLERGIES:    Allergies   Allergen Reactions    Percocet [Oxycodone-Acetaminophen]           Subjective     · Constitutional:  Negative for activity change, appetite change,unexpected weight change, chills, fever, and fatigue. · HENT: Negative for ear pain, sore throat,  Rhinorrhea, sinus pain, sinus pressure, congestion. · Eyes:  Negative for pain and discharge. · Respiratory:  Negative for chest tightness, shortness of breath, wheezing, and cough. · Cardiovascular:  Negative for chest pain, palpitations and leg swelling. · Gastrointestinal: Negative for abdominal pain, blood in stool, constipation,diarrhea, nausea and vomiting. · Endocrine: Negative for cold intolerance, heat intolerance, polydipsia, polyphagia and polyuria. · Genitourinary: Negative for difficulty urinating, dysuria, flank pain, frequency, hematuria and urgency. · Musculoskeletal: Negative for arthralgias, back pain, joint swelling, myalgias, neck pain and neck stiffness. · Skin: Negative for rash and wound. Positive for right shoulder pain  · Allergic/Immunologic: Negative for environmental allergies and food allergies. · Neurological:  Negative for dizziness, light-headedness, numbness and headaches. · Hematological:  Negative for adenopathy. Does not bruise/bleed easily. · Psychiatric/Behavioral: Negative for self-injury, sleep disturbance and suicidal ideas. Objective     PHYSICAL EXAM:   · Constitutional: Shonda Meléndez is oriented to person, place, and time. Vital signs are normal. Appears well-developed and well-nourished. · HEENT:   · Head: Normocephalic and atraumatic. · Eyes:PERRL, EOMI, Conjunctiva normal, No discharge. · Neck: Full passive range of motion. · Cardiovascular: Normal rate, regular rhythm, S1, S2, no murmur, no gallop, no friction rub, intact distal pulses. No carotid bruit.  No lower extremity edema  · Pulmonary/Chest: Breath sounds are clear throughout, No respiratory distress, No wheezing, No chest tenderness. Effort normal  Musculoskeletal: Physical Exam  Musculoskeletal:      Right shoulder: Tenderness present. No swelling, deformity, effusion, bony tenderness or crepitus. Decreased range of motion (decreased external rotation, abduction). Decreased strength. Normal pulse. · Neurological: Alert and oriented to person, place, and time. Normal motor function, Normal sensory function, No focal deficits noted. He has normal strength. · Skin: Skin is warm, dry and intact. No obvious lesions on exposed skin  · Psychiatric: Normal mood and affect. Speech is normal and behavior is normal.     Nursing note and vitals reviewed. Blood pressure 126/82, pulse 73, temperature 97.2 °F (36.2 °C), temperature source Temporal, weight 187 lb (84.8 kg), SpO2 97 %. Body mass index is 32.1 kg/m². Wt Readings from Last 3 Encounters:   07/12/21 187 lb (84.8 kg)   11/03/20 196 lb (88.9 kg)   07/10/19 194 lb 8 oz (88.2 kg)     BP Readings from Last 3 Encounters:   07/12/21 126/82   11/03/20 110/70   07/10/19 118/68       No results found for this visit on 07/12/21. Completed Orders/Prescriptions   No orders of the defined types were placed in this encounter. AssessmentPlan/Medical Decision Making     1. Acute pain of right shoulder  - PT as scheduled  - prednisone as prescribed - do not take with naproxen  - exercises handout provided      Return in about 1 month (around 8/12/2021) for right shoulder pain. 1.  Alphonso Hernandez received counseling on the following healthy behaviors: nutrition, exercise and medication adherence  2. Patient given educational materials - see patient instructions  3. Was a self-tracking handout given in paper form or via [employer]? No  If yes, see orders or list here. 4.  Discussed use, benefit, and side effects of prescribed medications.   Barriers to medication compliance addressed. All patient questions answered. Pt voiced understanding. 5.  Reviewed prior labs, imaging, consultation, follow up, and health maintenance  6. Continue current medications, diet and exercise. 7. Discussed use, benefit, and side effects of prescribed medications. Barriers to medication compliance addressed. All her questions were answered. Pt voiced understanding. Madeleine Talavera will continue current medications, diet and exercise. Patient given educational materials on shoulder exercises    Of the 25 minute duration appointment visit, Han Person CNP spent at least 50% of the face-to-face time in counseling, explanation of diagnosis, planning of further management, and answering all questions. Signed:  Han Person CNP    This note is created with the assistance of a speech-recognition program.  While intending to generate a document that actually reflects the content of the visit, no guarantees can be provided that every mistake has been identified and corrected by editing.

## 2021-07-15 ENCOUNTER — HOSPITAL ENCOUNTER (OUTPATIENT)
Dept: PHYSICAL THERAPY | Facility: CLINIC | Age: 46
Setting detail: THERAPIES SERIES
Discharge: HOME | End: 2021-07-15
Payer: COMMERCIAL

## 2021-07-15 PROCEDURE — 97140 MANUAL THERAPY 1/> REGIONS: CPT

## 2021-07-15 PROCEDURE — 97016 VASOPNEUMATIC DEVICE THERAPY: CPT

## 2021-07-15 PROCEDURE — 97110 THERAPEUTIC EXERCISES: CPT

## 2021-07-15 NOTE — FLOWSHEET NOTE
[] [facility]) - [facility] &  Therapy  [address]  [phone]  F: [phone] [x] [address]  [facility] [address]   [address]  P: [phone]  F: [phone] [] [address] &  Therapy  [address]  P: [phone]  F: [phone] [] [address]  P: [phone]  F: [phone] [] [address]  [facility]   [address]   [place]: [phone]  F: [phone]      Physical Therapy Daily Treatment Note    Date:  7/15/2021  Patient Name:  Marshall Hurst    :  1975  MRN: [id number]  Physician: [address] [place] [address] Street: [facility]  Medical Diagnosis: M25.511- acute pain of right shoulder              Rehab Codes: M25.511; M75.102; M62.81   Onset Date: 2021             Next 's appt: 2021   Visit# / total visits: 2     Cancels/No Shows:    Subjective:    Pain:  [x] Yes  [] No Location: R shoulder  Pain Rating: (0-10 scale) 0/10  Pain altered Tx:  [x] No  [] Yes  Action:  Comments: Pt arrives noting 0/10 pain at rest, however, with movement pain can increase to 5-10/10.      Objective:  Modalities: vaso to R shoulder in seated x15'  Precautions: potential R shoulder RTC tendinitis- may have been brought on secondary to COVID-19 vaccine   Exercises: PT services required prior to insurance auth for MRI  Exercise Reps/ Time Weight/ Level Comments   R UT stretch  3x30\"     Supine wand AAROM FLEX, ER/IR x10, 3\" ea   HEP  Towel roll supporting humerus for ER/IR  Instructed to only approach pain    Supine wand chest press x10    HEP   Supine wand SA punches  x10           Seated scapular retractions  x10, 3\"   HEP   Seated table-slides scaption  x10, 3\"   HEP    seated SB roll outs  x10, 3\"  blue SB Other: Manual: R shoulder PROM with distraction, manual STM to R UT and via hypervolt x15'    Treatment Charges: Mins Units   []  Modalities     [x]  Ther Exercise 32 2   [x]  Manual Therapy 15 1   []  Ther Activities     []  Aquatics     [x]  Vasocompression 15 1   []  Other     Total Treatment time 62 4     Assessment: [x] Progressing toward goals. Initiated session with application of PROM to R shoulder most limited into abduction. Grimaces of pain at end range abduction, flexion, and scaption this date. Manual STM to R UT followed by hypervolt to reduce muscular tension. Reviewed all exercises issued for HEP with good recollection. Added UT stretch, table slides into scaption and seated physio ball roll outs to further improve ROM. Serratus punches implemented this date without issue. Ended with vaso to R shoulder to decrease pain and muscle soreness. Will progress exercise program as pt tolerates. [] No change. [] Other:  [x] Patient would continue to benefit from skilled physical therapy services in order to: inc R shoulder A/PROM; R shoulder gross strength; and overall improve tolerance for sleeping upon R side, reaching and lifting with R UE. Problems:    [x] ? Pain:  [x] ? ROM:  [x] ? Strength:  [x] ? Function:  [] Other:       STG: (to be met in 6 treatments)  ? Pain: Patient will report < 5/10 pain at rest and < 8/10 pain with active movement in order to improve QOL. ? ROM: Will demo > 130 deg R shoulder AROM FLEX/ABD with < 5/10 P in order to better match L and improve functional reaching. Will deny P at end-ranges R shoulder SCAPT, ER PROM. Also T10 R shoulder IR behind the back with < 5/10 P.   ? Strength: Will demo 4/5 ER/IR, 4+/5 FLEX/ABD R shoulder MMT with < 5/10 P in order to improve light lifting. ?  Function: Patient will report decreased TTP to anterior, middle deltoids, posterior shoulder/cuff in order to indicate decreased inflammation and improved healing of injured site. Patient to be independent with home exercise program as demonstrated by performance with correct form without cues. LTG: (to be met in 12 treatments)  Will report < 5/10 P with sleeping upon R side, reaching, and lifting for improved QOL. Improve UEFS to 25% limited/impaired                Patient goals: \"to feel better. \"     Pt. Education:  [x] Yes  [] No  [x] Reviewed Prior HEP/Ed  Method of Education: [x] Verbal  [x] Demo  [] Written  Comprehension of Education:  [x] Verbalizes understanding. [] Demonstrates understanding. [] Needs review. [] Demonstrates/verbalizes HEP/Ed previously given. Plan: [x] Continue current frequency toward long and short term goals. [x] Specific Instructions for subsequent treatments: Follow-up with pt regarding tolerance to first treatment session- continue if proven beneficial or modify PRN.        Time In: 5:00 pm           Time Out: 6:02 pm    Electronically signed by:  Ambrosio Thurman PTA

## 2021-07-16 ENCOUNTER — HOSPITAL ENCOUNTER (OUTPATIENT)
Dept: PHYSICAL THERAPY | Facility: CLINIC | Age: 46
Setting detail: THERAPIES SERIES
Discharge: HOME | End: 2021-07-16
Payer: COMMERCIAL

## 2021-07-16 PROCEDURE — 97016 VASOPNEUMATIC DEVICE THERAPY: CPT

## 2021-07-16 PROCEDURE — 97140 MANUAL THERAPY 1/> REGIONS: CPT

## 2021-07-16 PROCEDURE — 97110 THERAPEUTIC EXERCISES: CPT

## 2021-07-16 NOTE — FLOWSHEET NOTE
[] [facility]) - [facility] &  Therapy  [address]  [phone]  F: [phone] [x] [address]  [place] [address]   [address]  P: [phone]  F: [phone] [] [address] &  Therapy  [address] [facility] [address]  P: [phone]  F: [phone] [] [address]  P: [phone]  F: [phone] [] [address]  [facility]   [address]   [place]: [phone]  F: [phone]      Physical Therapy Daily Treatment Note    Date:  2021  Patient Name:  Bia Calles    :  1975  MRN: [id number]  Physician: [address] [place] [address] Street: [facility] Diagnosis: M25.511- acute pain of right shoulder              Rehab Codes: M25.511; M75.102; M62.81   Onset Date: 2021             Next 's appt: 2021   Visit# / total visits: 3/12     Cancels/No Shows:    Subjective:    Pain:  [x] Yes  [] No Location: R shoulder  Pain Rating: (0-10 scale) 4/10  Pain altered Tx:  [x] No  [] Yes  Action:  Comments: Pt arrives noting muscle soreness from yesterdays treatment. Reports dull, aching pain in R shoulder this date.      Objective:  Modalities: vaso to R shoulder in seated, mod compression, 36 degrees x15'  Precautions: potential R shoulder RTC tendinitis- may have been brought on secondary to COVID-19 vaccine   Exercises: PT services required prior to insurance auth for MRI  Exercise Reps/ Time Weight/ Level Comments   Pulley's  1', 1'  Flex, abd    R UT stretch  3x30\"     Retro shoulder rolls  x15     Supine wand AAROM FLEX, ER/IR x10, 3\" ea   HEP  Towel roll supporting humerus for ER/IR  Instructed to only approach pain    Supine wand chest press x10    HEP   Supine wand SA punches  x10           Supine rhythmic stabs  3x20\" Supine serratus punches  x10  RUE         Seated scapular retractions  x10, 3\"   HEP   Seated table-slides scaption, abduction x10, 3\" ea   HEP   Seated SB roll outs  x10, 3\"  blue SB flexion                      Side lying          ER  10x       Sleeper stretch             Standing       t band rows  10x Orange     t band extension  10x Orange     Wand extension  10x     Wand IR 10x           Other:    Manual: R shoulder PROM with distraction, MFR to R UT via hypervolt x10'    Treatment Charges: Mins Units   []  Modalities     [x]  Ther Exercise 37 2   [x]  Manual Therapy 10 1   []  Ther Activities     []  Aquatics     [x]  Vasocompression 15 1   []  Other     Total Treatment time 62 4     Assessment: [x] Progressing toward goals. Initiated session with UT stretches and implemented pulley's this date to further increase ROM. Application of PROM with limited range and pain at end range into abduction. Rhythmic stabilization applied to R shoulder in supine and completed active serratus punches with good tolerance to all. Addition of side lying ER with slight ache present. Added wand extension and IR in standing without complaints. Implemented t band strengthening with demonstrations provided to ensure scap squeezes and complete without UT compensation. Ended with vaso to reduce pain and muscle soreness. Encouraged daily HEP, focus on postural awareness and applying ice to R shoulder as needed to reduce pain. [] No change. [] Other:  [x] Patient would continue to benefit from skilled physical therapy services in order to: inc R shoulder A/PROM; R shoulder gross strength; and overall improve tolerance for sleeping upon R side, reaching and lifting with R UE. Problems:    [x] ? Pain:  [x] ? ROM:  [x] ? Strength:  [x] ? Function:  [] Other:       STG: (to be met in 6 treatments)  ? Pain: Patient will report < 5/10 pain at rest and < 8/10 pain with active movement in order to improve QOL. ?  ROM: Will demo > 130 deg R shoulder AROM FLEX/ABD with < 5/10 P in order to better match L and improve functional reaching. Will deny P at end-ranges R shoulder SCAPT, ER PROM. Also T10 R shoulder IR behind the back with < 5/10 P.   ? Strength: Will demo 4/5 ER/IR, 4+/5 FLEX/ABD R shoulder MMT with < 5/10 P in order to improve light lifting. ? Function: Patient will report decreased TTP to anterior, middle deltoids, posterior shoulder/cuff in order to indicate decreased inflammation and improved healing of injured site. Patient to be independent with home exercise program as demonstrated by performance with correct form without cues. LTG: (to be met in 12 treatments)  Will report < 5/10 P with sleeping upon R side, reaching, and lifting for improved QOL. Improve UEFS to 25% limited/impaired                Patient goals: \"to feel better. \"     Pt. Education:  [x] Yes  [] No  [x] Reviewed Prior HEP/Ed  Method of Education: [] Verbal  [] Demo  [] Written  Comprehension of Education:  [] Verbalizes understanding. [] Demonstrates understanding. [] Needs review. [x] Demonstrates/verbalizes HEP/Ed previously given. Plan: [x] Continue current frequency toward long and short term goals. [x] Specific Instructions for subsequent treatments: Follow-up with pt regarding tolerance to first treatment session- continue if proven beneficial or modify PRN.        Time In: 1:33 pm           Time Out: 2:35 pm    Electronically signed by:  Ashleigh Quinteros PTA

## 2021-07-21 ENCOUNTER — APPOINTMENT (OUTPATIENT)
Dept: PHYSICAL THERAPY | Facility: CLINIC | Age: 46
End: 2021-07-21
Payer: COMMERCIAL

## 2021-07-23 ENCOUNTER — HOSPITAL ENCOUNTER (OUTPATIENT)
Dept: PHYSICAL THERAPY | Facility: CLINIC | Age: 46
Setting detail: THERAPIES SERIES
Discharge: HOME | End: 2021-07-23
Payer: COMMERCIAL

## 2021-07-23 PROCEDURE — 97016 VASOPNEUMATIC DEVICE THERAPY: CPT

## 2021-07-23 PROCEDURE — 97110 THERAPEUTIC EXERCISES: CPT

## 2021-07-23 NOTE — FLOWSHEET NOTE
[] [facility]) - [facility] &  Therapy  [address]  [phone]  F: [phone] [x] [address] [facility] [address]   [address]  P: [phone]  F: [phone] [] [address] &  Therapy  [address] [facility]  P: [phone]  F: [phone] [] [address]  P: [phone]  F: [phone] [] [address]  [facility]   [address]   [place]: [phone]  F: [phone]      Physical Therapy Daily Treatment Note    Date:  2021  Patient Name:  Pam Chung    :  1975  MRN: [id number]  Physician: [address] [place] [address] Street: [facility]  Medical Diagnosis: M25.511- acute pain of right shoulder              Rehab Codes: M25.511; M75.102; M62.81   Onset Date: 2021             Next 's appt: 2021   Visit# / total visits: 3/12     Cancels/No Shows: 0/1    Subjective:    Pain:  [x] Yes  [] No Location: R shoulder  Pain Rating: (0-10 scale) 5-6/10  Pain altered Tx:  [x] No  [] Yes  Action: N/A  Comments: Reports sig muscle soreness over past several days- especially in PM- but without specific aggravating factors or SAMANTHA. \"Tingles in my fingers a lot. \" \"Some days are better than others [regarding current HEP interventions]. \" Unclear relief or worsening of symptoms with first two treatment sessions. x1 day remaining of steroid pack- also unclear relief. Recent visit with referring MD- \"said you guys are free to do anything. \" Plans to follow-up again on 2021. Requests exercises for R wrist soreness- educated per below. Pt approximately x30' late to treatment appointment- but able to accommodate.      Objective:  Modalities: vaso to R shoulder: 34 deg, LOW in sitting for x10' post finish of treatment session Precautions: potential R shoulder RTC tendinitis- may have been brought on secondary to COVID-19 vaccine   Exercises: PT services required prior to insurance auth for MRI  Exercise Reps/ Time Weight/ Level Comments   UBE x3.5'/x3.5' L1 B UE A- FWD/RETRO- inc use of L UE  NEW- denies inc P   AAROM Dayne's for R UE  x1' ea  Flex, abd  Instructed to approach pain- not push through pain     AAROM wall-slides FLEX x10, 3\" ea  Instructed to utilize BW for inc ease          Supine       Supine wand AAROM FLEX, ER/IR x10, 3\" ea   HEP  Towel roll supporting humerus for ER/IR  Instructed to only approach pain    Supine wand chest press x10    HEP   Supine wand SA punches  x10     Supine rhythmic stabs in 90/90  3x15\" ea           Seated       Seated scapular retractions  x10, 3\"   HEP   Seated table-slides scaption, abduction x10, 3\" ea   HEP   Seated SB roll outs  x10, 3\" Large red  Flexion, R/L             Side lying          ER  10x       Sleeper stretch             Standing       t band rows  10x Orange     t band extension  10x Orange     Wand extension  10x     Wand IR 10x           Other: BOLD is completed. Manual: MFR to R UT via hypervolt x10'; followed with R shoulder PROM with distraction- HELD 7/23/2021 d/t tardiness, but may resume in future. Treatment Charges: Mins Units   []  Modalities     [x]  Ther Exercise 22 1   []  Manual Therapy     []  Ther Activities     []  Aquatics     [x]  Vasocompression 10 1   []  Other     Total Treatment time 32 2     [x6' on UBE]    Assessment: [] Progressing toward goals. [] No change. [x] Other: Pt presents with sig R shoulder muscle soreness- rated at 6/10- but unclear tolerance to start of PT services, HEP handout, and use of steroid pack. Therefore, proceeded with treatment with caution- however- demos near full ROM upon AAROM pulleys and wall-slides flexion. Instructed pt to approach pain only, and to not push into pain- verbalized understanding.  Pt requests exercises for R wrist pain and soreness- educated that unable to provide as have not specifically evaluated her wrist- but may benefit from OT services with hand specialist if symptoms become more consistent or develop a pattern. Treatment session significantly limited as pt tra. Emphasized timeliness and attendance to future sessions and issued new calendar of appointments. Consider ideas listed below next visit. [x] Patient would continue to benefit from skilled physical therapy services in order to: inc R shoulder A/PROM; R shoulder gross strength; and overall improve tolerance for sleeping upon R side, reaching and lifting with R UE. Problems:    [x] ? Pain:  [x] ? ROM:  [x] ? Strength:  [x] ? Function:  [] Other:       STG: (to be met in 6 treatments)  1. ? Pain: Patient will report < 5/10 pain at rest and < 8/10 pain with active movement in order to improve QOL. 2. ? ROM: Will demo > 130 deg R shoulder AROM FLEX/ABD with < 5/10 P in order to better match L and improve functional reaching. Will deny P at end-ranges R shoulder SCAPT, ER PROM. Also T10 R shoulder IR behind the back with < 5/10 P.   3. ? Strength: Will demo 4/5 ER/IR, 4+/5 FLEX/ABD R shoulder MMT with < 5/10 P in order to improve light lifting. 4. ? Function: Patient will report decreased TTP to anterior, middle deltoids, posterior shoulder/cuff in order to indicate decreased inflammation and improved healing of injured site. 5. Patient to be independent with home exercise program as demonstrated by performance with correct form without cues. LTG: (to be met in 12 treatments)  1. Will report < 5/10 P with sleeping upon R side, reaching, and lifting for improved QOL. 2. Improve UEFS to 25% limited/impaired                Patient goals: \"to feel better. \"     Pt.  Education:  [x] Yes  [] No  [x] Reviewed Prior HEP/Ed  Method of Education: [] Verbal  [] Demo  [] Written  Comprehension of Education:  [] [employer] understanding. [] Demonstrates understanding. [] Needs review. [x] Demonstrates/verbalizes HEP/Ed previously given. Plan: [x] Continue current frequency toward long and short term goals. [x] Specific Instructions for subsequent treatments: May resume MT, supine/standing interventions next visit.        Time In: 10:35AM           Time Out: 11:13AM    Electronically signed by:  Belem Samayoa PT

## 2021-07-27 ENCOUNTER — HOSPITAL ENCOUNTER (OUTPATIENT)
Dept: PHYSICAL THERAPY | Facility: CLINIC | Age: 46
Setting detail: THERAPIES SERIES
Discharge: HOME | End: 2021-07-27
Payer: COMMERCIAL

## 2021-07-27 PROCEDURE — 97140 MANUAL THERAPY 1/> REGIONS: CPT

## 2021-07-27 PROCEDURE — 97016 VASOPNEUMATIC DEVICE THERAPY: CPT

## 2021-07-27 PROCEDURE — 97110 THERAPEUTIC EXERCISES: CPT

## 2021-07-27 NOTE — FLOWSHEET NOTE
[] [facility]) - [facility] &  Therapy  [address]  [phone]  F: [phone] [x] [address] [facility]   [address]  P: [phone]  F: [phone] [] [address] &  Therapy  [address] [facility]  P: [phone]  F: [phone] [] [address]  P: [phone]  F: [phone] [] [address]  [facility]   [address]   [place]: [phone]  F: [phone]      Physical Therapy Daily Treatment Note    Date:  2021  Patient Name:  Melody Goldsmith    :  1975  MRN: [id number]  Physician: [address] [place] [address] Street: Melo  Medical Diagnosis: M25.511- acute pain of right shoulder              Rehab Codes: M25.511; M75.102; M62.81   Onset Date: 2021             Next 's appt: 2021   Visit# / total visits:      Cancels/No Shows: 0/1    Subjective:    Pain:  [x] Yes  [] No Location: R shoulder  Pain Rating: (0-10 scale) 2/10  Pain altered Tx:  [x] No  [] Yes  Action: N/A  Comments: Pt arrives reporting mild soreness and pain. Pt states she continues to not be able to sleep throughout the night secondary to pain. Pt states her pain levels can increase to 8/10 on \"bad days\". Pt state her wrist is doing a little better and believes it was acting up due to the weather.      Objective:  Modalities: vaso to R shoulder: 34 deg, LOW in sitting for x15' post finish of treatment session   Precautions: potential R shoulder RTC tendinitis- may have been brought on secondary to COVID-19 vaccine   Exercises: PT services required prior to insurance auth for MRI  Exercise Reps/ Time Weight/ Level Comments   UBE x3.5'/x3.5' L1 B UE A- FWD/RETRO- inc use of L UE  NEW- denies inc P   AAROM Dayne's for R UE  x2' ea Flex, abd  Instructed to approach pain- not push through pain    Inc time 7/27   AAROM wall-slides FLEX x10, 3\" ea  Instructed to utilize BW for inc ease          Supine       Supine wand AAROM FLEX, ER/IR x10, 3\" ea   HEP  Towel roll supporting humerus for ER/IR  Instructed to only approach pain    Supine wand chest press x10    HEP   Supine wand SA punches  x10     Supine rhythmic stabs in 90/90  3x15\" ea           Seated       Seated scapular retractions  x10, 3\"   HEP   Seated table-slides scaption, abduction x10, 3\" ea   HEP   Seated SB roll outs  x10, 3\" Large red  Flexion, R/L             Side lying          ER  10x       Sleeper stretch             Standing       t band rows  10x2 Lime  Progressed 7/27   t band extension  10x2 Lime  Progressed 7/27   t band ER  10x2  Lime  Added 7/27    Wand extension  10x     Wand IR 10x           Other: BOLD is completed. Manual: MFR to R UT via hypervolt x10'; followed with R shoulder PROM with distraction     Treatment Charges: Mins Units   []  Modalities     [x]  Ther Exercise 37 2   [x]  Manual Therapy 10 1   []  Ther Activities     []  Aquatics     [x]  Vasocompression 15 1   []  Other     Total Treatment time 62 4     [x6' on UBE]     Assessment: [x] Progressing toward goals. Initiated treatment with UBE warm up followed by pulleys to reduce muscular tension. Pt presents with more pain and difficulty with pulley abduction, however is able to complete. Able to resume and progress theraband exercises with increased fatigue and soreness, however no increase of pain. Continued with MFR followed by PROM with abduction being the most painful and limited in range secondary to pain. Ended session with vasocompression for 15 minutes this date to reduce soreness. Pt reports she \"feels better\" after treatment this date. Time spent educating pt on elevating R arm on pillow at night to help reduce stress on shoulder.  Pt verbalizes understanding of pillow placement for sleeping and understands the importance of completion of HEP. [] No change. [x] Other:  [x] Patient would continue to benefit from skilled physical therapy services in order to: inc R shoulder A/PROM; R shoulder gross strength; and overall improve tolerance for sleeping upon R side, reaching and lifting with R UE. Problems:    [x] ? Pain:  [x] ? ROM:  [x] ? Strength:  [x] ? Function:  [] Other:       STG: (to be met in 6 treatments)  ? Pain: Patient will report < 5/10 pain at rest and < 8/10 pain with active movement in order to improve QOL. ? ROM: Will demo > 130 deg R shoulder AROM FLEX/ABD with < 5/10 P in order to better match L and improve functional reaching. Will deny P at end-ranges R shoulder SCAPT, ER PROM. Also T10 R shoulder IR behind the back with < 5/10 P.   ? Strength: Will demo 4/5 ER/IR, 4+/5 FLEX/ABD R shoulder MMT with < 5/10 P in order to improve light lifting. ? Function: Patient will report decreased TTP to anterior, middle deltoids, posterior shoulder/cuff in order to indicate decreased inflammation and improved healing of injured site. Patient to be independent with home exercise program as demonstrated by performance with correct form without cues. LTG: (to be met in 12 treatments)  Will report < 5/10 P with sleeping upon R side, reaching, and lifting for improved QOL. Improve UEFS to 25% limited/impaired                Patient goals: \"to feel better. \"     Pt. Education:  [x] Yes  [] No  [x] Reviewed Prior HEP/Ed  Method of Education: [] Verbal  [] Demo  [] Written  Comprehension of Education:  [] Verbalizes understanding. [] Demonstrates understanding. [] Needs review. [x] Demonstrates/verbalizes HEP/Ed previously given. Plan: [x] Continue current frequency toward long and short term goals. [x] Specific Instructions for subsequent treatments: Resume S/L and supine interventions.        Time In: 3:56 pm           Time Out: 5:04 pm     Electronically signed by: Laura Wilson, PTA

## 2021-07-29 ENCOUNTER — APPOINTMENT (OUTPATIENT)
Dept: PHYSICAL THERAPY | Facility: CLINIC | Age: 46
End: 2021-07-29
Payer: COMMERCIAL

## 2021-07-29 ENCOUNTER — HOSPITAL ENCOUNTER (OUTPATIENT)
Dept: PHYSICAL THERAPY | Facility: CLINIC | Age: 46
Setting detail: THERAPIES SERIES
Discharge: HOME | End: 2021-07-29
Payer: COMMERCIAL

## 2021-07-29 NOTE — FLOWSHEET NOTE
[] [address]  [address]    [phone]  F: [phone]   [x] [address] [facility] 36   [address]  P: [phone]  F: [phone]  [] [address] &  Therapy  [address] [facility]  P: [phone]  F: [phone] [] [address]  P: [phone]  F: [phone]  [] [address]  [address] [facility] [address]   [address]   [place]: [phone]  F: [phone]   [] [place] [id number] [facility] [address]  [place]: [phone]   F: [phone]     Physical Therapy Cancel/No Show note    Date: 2021  Patient: Bia Calles  : 1975  MRN: [id number]    Cancels/No Shows to date:     For today's appointment patient:    [x]  Cancelled    [] Rescheduled appointment    [] No-show     Reason given by patient:    [x]  Patient ill    []  Conflicting appointment    [] No transportation      [] Conflict with work    [] No reason given    [] Weather related    [] COVID-19    [] Other:      Comments: pt woke up sick.        [x] Next appointment was confirmed 8/3/21 @ 5:30 pm     Electronically signed by: Seun Hua PTA

## 2021-08-03 ENCOUNTER — HOSPITAL ENCOUNTER (OUTPATIENT)
Dept: PHYSICAL THERAPY | Facility: CLINIC | Age: 46
Setting detail: THERAPIES SERIES
Discharge: HOME | End: 2021-08-03
Payer: COMMERCIAL

## 2021-08-03 PROCEDURE — 97016 VASOPNEUMATIC DEVICE THERAPY: CPT

## 2021-08-03 PROCEDURE — 97110 THERAPEUTIC EXERCISES: CPT

## 2021-08-03 PROCEDURE — 97140 MANUAL THERAPY 1/> REGIONS: CPT

## 2021-08-03 NOTE — FLOWSHEET NOTE
[] [facility]) - [facility] &  Therapy  [address]  [phone]  F: [phone] [x] [address]  [facility] 36   [address]  P: [phone]  F: [phone] [] [address] &  Therapy  [address] [facility]  P: [phone]  F: [phone] [] [address]  P: [phone]  F: [phone] [] [address]  [facility]   [address]   [place]: [phone]  F: [phone]      Physical Therapy Daily Treatment Note    Date:  8/3/2021  Patient Name:  Miya Clement    :  1975  MRN: [id number]  Physician: [address] [place] [address] Street: Kristy Sosa  Medical Diagnosis: M25.511- acute pain of right shoulder              Rehab Codes: M25.511; M75.102; M62.81   Onset Date: 2021             Next 's appt: 2021   Visit# / total visits:      Cancels/No Shows:     Subjective:    Pain:  [x] Yes  [] No Location: R shoulder  Pain Rating: (0-10 scale) 6-7/10  Pain altered Tx:  [x] No  [] Yes  Action: N/A    Comments: Pt with higher pain levels d/t slipping on water and falling on her R arm this morning.      Objective:  Modalities: vaso to R shoulder: 34 deg, LOW in sitting for x15' post finish of treatment session   Precautions: potential R shoulder RTC tendinitis- may have been brought on secondary to COVID-19 vaccine   Exercises: PT services required prior to insurance auth for MRI  Exercise Reps/ Time Weight/ Level Comments   UBE x3.5'/x3.5' L1 B UE A- FWD/RETRO- inc use of L UE  NEW- denies inc P   AAROM Dayne's for R UE  x2' ea  Flex, abd  Instructed to approach pain- not push through pain    Inc time    AAROM wall-slides FLEX x10, 3\" ea            Supine       Supine wand AAROM FLEX, ER/IR x10, 3\" ea   HEP  Towel roll supporting humerus for ER/IR  Instructed to only approach pain    Supine wand chest press x10    HEP   Supine wand SA punches  x10     Supine rhythmic stabs in 90/90  3x15\" ea           Seated       Seated scapular retractions  x10, 3\"   HEP   Seated table-slides scaption, abduction x10, 3\" ea   HEP   Seated SB roll outs  x10, 3\" Large red  Flexion, R/L             Side lying          ER  10x       Sleeper stretch             Standing       t band rows  10x2 Lime  Progressed 7/27   t band extension  10x2 Lime  Progressed 7/27   t band ER  10x2  Lime  Added 7/27    Wand extension  10x     Wand IR 10x           Other: BOLD is completed. Manual: MFR to R UT via hypervolt x10'; followed with R shoulder PROM with distraction     Treatment Charges: Mins Units   []  Modalities     [x]  Ther Exercise 34 2   [x]  Manual Therapy 10 1   []  Ther Activities     []  Aquatics     [x]  Vasocompression 15 1   []  Other     Total Treatment time 59 4   [x7' on UBE]     Assessment: [x] Progressing toward goals. Pt overall more sore during exercises this date secondary to recent fall, however pain did not alter treatment. Resumed supine wand exercises with good tolerance. Pt notes increased pain during shoulder flexion motion and rows with tband. Ended with vaso for pain relief. [] No change. [x] Other:  [x] Patient would continue to benefit from skilled physical therapy services in order to: inc R shoulder A/PROM; R shoulder gross strength; and overall improve tolerance for sleeping upon R side, reaching and lifting with R UE. Problems:    [x] ? Pain:  [x] ? ROM:  [x] ? Strength:  [x] ? Function:  [] Other:       STG: (to be met in 6 treatments)  1. ? Pain: Patient will report < 5/10 pain at rest and < 8/10 pain with active movement in order to improve QOL. 2. ? ROM: Will demo > 130 deg R shoulder AROM FLEX/ABD with < 5/10 P in order to better match L and improve functional reaching.  Will deny P at end-ranges R shoulder SCAPT, ER PROM. Also T10 R shoulder IR behind the back with < 5/10 P.   3. ? Strength: Will demo 4/5 ER/IR, 4+/5 FLEX/ABD R shoulder MMT with < 5/10 P in order to improve light lifting. 4. ? Function: Patient will report decreased TTP to anterior, middle deltoids, posterior shoulder/cuff in order to indicate decreased inflammation and improved healing of injured site. 5. Patient to be independent with home exercise program as demonstrated by performance with correct form without cues. LTG: (to be met in 12 treatments)  1. Will report < 5/10 P with sleeping upon R side, reaching, and lifting for improved QOL. 2. Improve UEFS to 25% limited/impaired                Patient goals: \"to feel better. \"     Pt. Education:  [] Yes  [x] No  [] Reviewed Prior HEP/Ed  Method of Education: [] Verbal  [] Demo  [] Written  Comprehension of Education:  [] Verbalizes understanding. [] Demonstrates understanding. [] Needs review. [] Demonstrates/verbalizes HEP/Ed previously given. Plan: [x] Continue current frequency toward long and short term goals. [x] Specific Instructions for subsequent treatments: Resume S/L interventions.        Time In: 5:30pm           Time Out: 6:36pm     Electronically signed by:  Nubia Velázquez PTA

## 2021-08-04 ENCOUNTER — HOSPITAL ENCOUNTER (OUTPATIENT)
Dept: PHYSICAL THERAPY | Facility: CLINIC | Age: 46
Setting detail: THERAPIES SERIES
Discharge: HOME | End: 2021-08-04
Payer: COMMERCIAL

## 2021-08-04 PROCEDURE — 97110 THERAPEUTIC EXERCISES: CPT

## 2021-08-04 PROCEDURE — 97016 VASOPNEUMATIC DEVICE THERAPY: CPT

## 2021-08-04 NOTE — FLOWSHEET NOTE
[] [facility]) - [facility] &  Therapy  [address]  [phone]  F: [phone] [x] [address]  [facility] 36   [address]  P: [phone]  F: [phone] [] [address] &  Therapy  [address] [facility] [address]  P: [phone]  F: [phone] [] [address]  P: [phone]  F: [phone] [] [address]  [facility]   [address]   [place]: [phone]  F: [phone]      Physical Therapy Daily Treatment Note    Date:  2021  Patient Name:  Valarie Serra    :  1975  MRN: [id number]  Physician: [facility] Street: Michele Duque  Medical Diagnosis: M25.511- acute pain of right shoulder              Rehab Codes: M25.511; M75.102; M62.81   Onset Date: 2021             Next 's appt: 2021   Visit# / total visits:      Cancels/No Shows:     Subjective:    Pain:  [x] Yes  [] No Location: R shoulder  Pain Rating: (0-10 scale) 2/10- at rest; 3-4/10- with specific movements   Pain altered Tx:  [x] No  [] Yes  Action: N/A  Comments: Sig pain this AM when waking- most likely secondary to slip, fall onto R elbow yesterday while at work. Continued, worse pain with sleeping in R S/L. Gradual dec in pain into today. Plans to visit referring MD on 2021- 2021. Agreeable to place PT services on hold starting today. Pt approximately x20' late to treatment appointment- but able to accommodate.        Objective:  Modalities: vaso to R shoulder: 34 deg, LOW in sitting for x15' post finish of treatment session   Precautions: potential R shoulder RTC tendinitis- may have been brought on secondary to COVID-19 vaccine   Exercises: PT services required prior to insurance auth for MRI  Exercise Reps/ Time Weight/ Level Comments   Goal update, UEFS, PT POC education    COMPLETED- 8/4/2021   PT services placed on hold pending follow-up with referring MD next week, as well as unclear progress with PT services thus far    UBE x3.5'/x3.5' L2 B UE A- FWD/RETRO- inc use of L UE  NEW- denies inc P   AAROM Dayne's for R UE  x2' ea  Flex, abd  Instructed to approach pain- not push through pain    Inc time 7/27   AAROM wall-slides FLEX/SCAPT/ABD x10, 3\" ea  P with ABD today- instructed to only approach pain          Supine       Supine wand AAROM FLEX, ER/IR x10, 3\" ea 3# HEP- reviewed   Towel roll supporting humerus for ER/IR  Instructed to only approach pain    Supine wand chest press x10  3#  HEP- reviewed    Supine wand SA punches  x10 3#    Supine rhythmic stabs in 90/90  3x15\" ea           Seated       Seated scapular retractions  x10, 3\"   HEP   Seated table-slides scaption, abduction x10, 3\" ea   HEP   Seated SB roll outs  x10, 3\" Large red  Flexion, R/L             Side lying          ER  10x       Sleeper stretch             Standing       t band rows  10x2 BTB  Progressed 7/27   t band extension  10x2 BTB Progressed 7/27   t band ER  10x2  Lime  Added 7/27    Wand extension  10x     Wand IR 10x           Other: BOLD is completed. See below for goal update. Manual: MFR to R UT via hypervolt x10'; followed with R shoulder PROM with distraction- HELD- 8/4/2021 d/t sig tardiness     Treatment Charges: Mins Units   []  Modalities     [x]  Ther Exercise 33 2   []  Manual Therapy     []  Ther Activities     []  Aquatics     [x]  Vasocompression 10 1   []  Other     Total Treatment time 43 3     [x7' on UBE]     Assessment: [] Progressing toward goals. [] No change. [x] Other: Pt presents with MOD R shoulder pain, and reports slip, fall onto R elbow while at work yesterday with resulting inc R scapular pain. Reports gradual dec of these symptoms into today, however, unclear progress with PT services thus far. Therefore, performed goal update this date- results as detailed above and below- most goals unclear. Pt continues to be limited in R shoulder ABD AROM and strength secondary to inc P, as well as sleeping in R S/L and lifting any type of weight with R UE. PT services placed on hold pending follow-up with referring MD next week, as well as unclear progress with PT services thus far. [x] Patient would continue to benefit from skilled physical therapy services in order to: inc R shoulder A/PROM; R shoulder gross strength; and overall improve tolerance for sleeping upon R side, reaching and lifting with R UE. Problems:    [x] ? Pain:  [x] ? ROM:  [x] ? Strength:  [x] ? Function:  [] Other:       STG: (to be met in 6 treatments)  1. ? Pain: Patient will report < 5/10 pain at rest and < 8/10 pain with active movement in order to improve QOL. - PROGRESSING- 2-8/10 over this past week    2. ? ROM: Will demo > 130 deg R shoulder AROM FLEX/ABD with < 5/10 P in order to better match L and improve functional reaching. Will deny P at end-ranges R shoulder SCAPT, ER PROM. Also T10 R shoulder IR behind the back with < 5/10 P.- NOT MET- 120 deg ABD with 6/10 P and compensations; 160 deg FLEX without pain at end-range; T10 IR behind the back with P   3. ? Strength: Will demo 4/5 ER/IR, 4+/5 FLEX/ABD R shoulder MMT with < 5/10 P in order to improve light lifting.- UNCLEAR- 4+/5 ER/IR without pain; 4/5 FLEX/ABD with P   4. ? Function: Patient will report decreased TTP to anterior, middle deltoids, posterior shoulder/cuff in order to indicate decreased inflammation and improved healing of injured site. - UNCLEAR- denies to anterior, middle deltoids- however- continued and inc to posterior shoulder/cuff  5. Patient to be independent with home exercise program as demonstrated by performance with correct form without cues. - UNCLEAR  LTG: (to be met in 12 treatments)  1.  Will report < 5/10 P with sleeping upon R side, reaching, and lifting for improved QOL.- NOT MET- has attempted reaching and lifting with R UE, however, requires sig A of L UE in order to complete tasks   2. Improve UEFS to 25% limited/impaired                Patient goals: \"to feel better. \"- UNCLEAR    Pt. Education:  [x] Yes  [] No  [] Reviewed Prior HEP/Ed  Method of Education: [x] Verbal  [] Demo  [] Written  Comprehension of Education:  [x] Verbalizes understanding. [] Demonstrates understanding. [] Needs review. [] Demonstrates/verbalizes HEP/Ed previously given. 8/4/2021- See grid above for details. Plan: [x] Continue current frequency toward long and short term goals. [x] Specific Instructions for subsequent treatments: PT services placed on hold pending visit with referring MD next week- will discharge if does not return in x1 month.        Time In: 5:20PM          Time Out: 6:10PM    Electronically signed by:  Sherren Skene, PT

## 2021-08-04 NOTE — PROGRESS NOTES
[] [facility]) - [facility] &  Therapy  [address]  [phone]  F: [phone] [x] [address]  [facility]   [address]  P: [phone]  F: [phone] [] [address] &  Therapy  [address]  P: [phone]  F: [phone] [] [address]  P: [phone]  F: [phone] [] [address]  [facility]   [address]   [place]: [phone]  F: [phone]      Physical Therapy Progress Note    Date: 2021      Patient: Lyla Flores  : 1975  MRN: [id number]    Physician: Cris Pelaez                                Insurance: [facility] Diagnosis: M25.511- acute pain of right shoulder              Rehab Codes: M25.511; M75.102; M62.81   Onset Date: 2021             Next 's appt: 2021   Visit# / total visits:                                   Cancels/No Shows:     Subjective:    Pain:  [x]? Yes  []? No   Location: R shoulder   Pain Rating: (0-10 scale) 2/10- at rest; 3-4/10- with specific movements   Pain altered Tx:  [x]? No  []? Yes  Action: N/A  Comments: Sig pain this AM when waking- most likely secondary to slip, fall onto R elbow yesterday while at work. Continued, worse pain with sleeping in R S/L. Gradual dec in pain into today. Plans to visit referring MD on 2021- 2021. Agreeable to place PT services on hold starting today.      Pt approximately x20' late to treatment appointment- but able to accommodate. Assessment:  Pt presents with MOD R shoulder pain, and reports slip, fall onto R elbow while at work yesterday with resulting inc R scapular pain. Reports gradual dec of these symptoms into today, however, unclear progress with PT services thus far.  Therefore, performed goal update this date- results as detailed above and below- most goals unclear. Pt continues to be limited in R shoulder ABD AROM and strength secondary to inc P, as well as sleeping in R S/L and lifting any type of weight with R UE. PT services placed on hold pending follow-up with referring MD next week, as well as unclear progress with PT services thus far. STG: (to be met in 6 treatments)  1. ? Pain: Patient will report < 5/10 pain at rest and < 8/10 pain with active movement in order to improve QOL. - PROGRESSING- 2-8/10 over this past week    2. ? ROM: Will demo > 130 deg R shoulder AROM FLEX/ABD with < 5/10 P in order to better match L and improve functional reaching. Will deny P at end-ranges R shoulder SCAPT, ER PROM. Also T10 R shoulder IR behind the back with < 5/10 P.- NOT MET- 120 deg ABD with 6/10 P and compensations; 160 deg FLEX without pain at end-range; T10 IR behind the back with P   3. ? Strength: Will demo 4/5 ER/IR, 4+/5 FLEX/ABD R shoulder MMT with < 5/10 P in order to improve light lifting.- UNCLEAR- 4+/5 ER/IR without pain; 4/5 FLEX/ABD with P   4. ? Function: Patient will report decreased TTP to anterior, middle deltoids, posterior shoulder/cuff in order to indicate decreased inflammation and improved healing of injured site. - UNCLEAR- denies to anterior, middle deltoids- however- continued and inc to posterior shoulder/cuff  5. Patient to be independent with home exercise program as demonstrated by performance with correct form without cues. - UNCLEAR  LTG: (to be met in 12 treatments)  1. Will report < 5/10 P with sleeping upon R side, reaching, and lifting for improved QOL.- NOT MET- has attempted reaching and lifting with R UE, however, requires sig A of L UE in order to complete tasks   2. Improve UEFS to 25% limited/impaired                Patient goals: \"to feel better. \"- UNCLEAR    Treatment Plan:  [x] Therapeutic Exercise   [address]  [] Iontophoresis: 4 mg/mL Dexamethasone Sodium Phosphate

## 2021-08-09 ENCOUNTER — TELEPHONE (OUTPATIENT)
Dept: FAMILY MEDICINE CLINIC | Age: 46
End: 2021-08-09

## 2021-08-09 NOTE — TELEPHONE ENCOUNTER
Pt had to reschedule her appt for her shoulder to 8-20 instead of 8-12. She is experiencing sharp pains in her shoulder and itching. She is \"taking a break from PT\". Any recommendations of anything to do in the meantime? She is wondering.  Ty.

## 2021-08-11 ENCOUNTER — TELEPHONE (OUTPATIENT)
Dept: FAMILY MEDICINE CLINIC | Age: 46
End: 2021-08-11

## 2021-08-16 ENCOUNTER — HOSPITAL ENCOUNTER (OUTPATIENT)
Dept: PHYSICAL THERAPY | Facility: CLINIC | Age: 46
Setting detail: THERAPIES SERIES
Discharge: HOME | End: 2021-08-16
Payer: COMMERCIAL

## 2021-08-16 PROCEDURE — 97110 THERAPEUTIC EXERCISES: CPT

## 2021-08-16 NOTE — FLOWSHEET NOTE
RTC tendinitis- may have been brought on secondary to COVID-19 vaccine   Exercises: PT services required prior to insurance auth for MRI  Exercise Reps/ Time Weight/ Level Comments   Goal update, UEFS, PT POC education    COMPLETED- 8/4/2021   PT services placed on hold pending follow-up with referring MD next week, as well as unclear progress with PT services thus far    UBE x2'F/2'R L2 B UE A- FWD/RETRO   AAROM Dayne's for R UE  x2' ea  Flex, abd     AAROM wall-slides FLEX/SCAPT/ABD x10, 3\" ea           Supine       PROM x  With distraction    Supine wand AAROM FLEX, ER/IR x10, 3\" ea 3# HEP- reviewed   Towel roll supporting humerus for ER/IR  Instructed to only approach pain    Supine wand chest press x10  3#  HEP- reviewed    Supine wand SA punches  x10 3#    Supine rhythmic stabs in 90/90  3x15\" ea           Seated       Seated scapular retractions  x10, 3\"   HEP   Seated table-slides scaption, abduction x10, 3\" ea   HEP   Seated SB roll outs  x10, 3\" Large red  Flexion, R/L             Side lying          ER  10x       Sleeper stretch             Standing       t band rows  10x2 BTB  Progressed 7/27   t band extension  10x2 BTB Progressed 7/27   t band ER  10x2  Lime  Added 7/27    Wand extension  10x     Wand IR 10x           Other: BOLD is completed. Not 8/16  Manual: MFR to R UT via hypervolt x10'; followed with R shoulder PROM with distraction- HELD- 8/4/2021 d/t sig tardiness     Treatment Charges: Mins Units   []  Modalities     [x]  Ther Exercise 39 3   []  Manual Therapy     []  Ther Activities     []  Aquatics     []  Vasocompression     []  Other     Total Treatment time 39 3   [x4' on UBE]     Assessment: [] Progressing toward goals. [] No change. [x] Other: D/t new onset symptoms, performed only gently ROM exercises this date. Taking caution until pt has her PCP follow up. Overall fair tolerance to treatment. Increased pain with abduction in standing and supine.  Pain and numbness/itching remain unchanged upon completion of treatment. [x] Patient would continue to benefit from skilled physical therapy services in order to: inc R shoulder A/PROM; R shoulder gross strength; and overall improve tolerance for sleeping upon R side, reaching and lifting with R UE. Problems:    [x] ? Pain:  [x] ? ROM:  [x] ? Strength:  [x] ? Function:  [] Other:       STG: (to be met in 6 treatments)  1. ? Pain: Patient will report < 5/10 pain at rest and < 8/10 pain with active movement in order to improve QOL. - PROGRESSING- 2-8/10 over this past week    2. ? ROM: Will demo > 130 deg R shoulder AROM FLEX/ABD with < 5/10 P in order to better match L and improve functional reaching. Will deny P at end-ranges R shoulder SCAPT, ER PROM. Also T10 R shoulder IR behind the back with < 5/10 P.- NOT MET- 120 deg ABD with 6/10 P and compensations; 160 deg FLEX without pain at end-range; T10 IR behind the back with P   3. ? Strength: Will demo 4/5 ER/IR, 4+/5 FLEX/ABD R shoulder MMT with < 5/10 P in order to improve light lifting.- UNCLEAR- 4+/5 ER/IR without pain; 4/5 FLEX/ABD with P   4. ? Function: Patient will report decreased TTP to anterior, middle deltoids, posterior shoulder/cuff in order to indicate decreased inflammation and improved healing of injured site. - UNCLEAR- denies to anterior, middle deltoids- however- continued and inc to posterior shoulder/cuff  5. Patient to be independent with home exercise program as demonstrated by performance with correct form without cues. - UNCLEAR  LTG: (to be met in 12 treatments)  1. Will report < 5/10 P with sleeping upon R side, reaching, and lifting for improved QOL.- NOT MET- has attempted reaching and lifting with R UE, however, requires sig A of L UE in order to complete tasks   2. Improve UEFS to 25% limited/impaired                Patient goals: \"to feel better. \"- UNCLEAR    Pt.  Education:  [] Yes  [x] No  [] Reviewed Prior HEP/Ed  Method of Education: [] Verbal  [] Demo  [] Written  Comprehension of Education:  [] Verbalizes understanding. [] Demonstrates understanding. [] Needs review. [] Demonstrates/verbalizes HEP/Ed previously given. 8/4/2021- See grid above for details. Plan: [x] Continue current frequency toward long and short term goals. [x] Specific Instructions for subsequent treatments: PT services placed on hold pending visit with referring MD next week- will discharge if does not return in x1 month.        Time In: 4:58pm          Time Out: 5:41pm    Electronically signed by:  Donnie Oshea PTA

## 2021-08-20 ENCOUNTER — OFFICE VISIT (OUTPATIENT)
Dept: FAMILY MEDICINE CLINIC | Age: 46
End: 2021-08-20
Payer: COMMERCIAL

## 2021-08-20 VITALS
OXYGEN SATURATION: 97 % | HEART RATE: 76 BPM | SYSTOLIC BLOOD PRESSURE: 116 MMHG | TEMPERATURE: 97.2 F | DIASTOLIC BLOOD PRESSURE: 72 MMHG | WEIGHT: 190 LBS | BODY MASS INDEX: 32.61 KG/M2

## 2021-08-20 DIAGNOSIS — M25.511 ACUTE PAIN OF RIGHT SHOULDER: ICD-10-CM

## 2021-08-20 DIAGNOSIS — M54.2 NECK PAIN: Primary | ICD-10-CM

## 2021-08-20 PROCEDURE — 99214 OFFICE O/P EST MOD 30 MIN: CPT | Performed by: NURSE PRACTITIONER

## 2021-08-20 RX ORDER — GABAPENTIN 300 MG/1
CAPSULE ORAL
COMMUNITY
Start: 2021-08-11

## 2021-08-20 ASSESSMENT — PATIENT HEALTH QUESTIONNAIRE - PHQ9
1. LITTLE INTEREST OR PLEASURE IN DOING THINGS: 0
SUM OF ALL RESPONSES TO PHQ QUESTIONS 1-9: 0
2. FEELING DOWN, DEPRESSED OR HOPELESS: 0
SUM OF ALL RESPONSES TO PHQ9 QUESTIONS 1 & 2: 0

## 2021-08-20 NOTE — PROGRESS NOTES
Claire Gtz, FNP-C  [address]  [phone] [facility] [place]. Ashleigh Serrano  [facility], [place] [address]  [phone]  [phone]    Salina Ponce is a 39 y.o. female who is here with c/o of:    Chief Complaint: Shoulder Pain (1 month follow up right shoulder, getting worse)      Patient Accompanied by: n/a    HPI - Salina Ponce is here today for the following:     Shoulder Pain   Pain location: entire right shoulder/right lateral neck. This is a new problem. Episode onset: 2 months ago - reports about 1 week after she received the COVID vaccine. There has been no history of extremity trauma. The problem occurs constantly. The problem has been gradually worsening. The quality of the pain is described as aching. The pain is severe (moderate to severe). Associated symptoms include a limited range of motion and tingling (tingling in right hand and fingers). Pertinent negatives include no fever, joint locking, joint swelling, numbness or stiffness. Associated symptoms comments: Numbness to right arm, itching to right side of torso/breast. . The symptoms are aggravated by activity. Treatments tried: naproxen, given Rx for prednisone, but has not started d/t an error in the Rx from the . The treatment provided no relief. Patient was seen in ED on 8/10 when the pain began in the right side of neck, numbness, and itching to the right side of body.     Patient Active Problem List   Diagnosis    Neck pain    Acute pain of right shoulder     Past Medical History:   Diagnosis Date    Allergic rhinitis     Anxiety     Depression     Hypothyroid     Irritable bowel syndrome       Past Surgical History:   Procedure Laterality Date    ENDOMETRIAL ABLATION      HYSTERECTOMY      OTHER SURGICAL HISTORY Right 08/09/2016    excision of axillary soft tissue mass with biopsy    THYROID SURGERY      benign mass    WRIST SURGERY      rt wrist tendon repair     Family History   Problem Relation Age of Onset    Diabetes Mother  Heart Disease Mother     High Blood Pressure Mother     High Cholesterol Mother     Depression Mother     High Blood Pressure Father     High Cholesterol Father     Alcohol Abuse Father     Asthma Father     High Blood Pressure Brother      Social History     Tobacco Use    Smoking status: Never Smoker    Smokeless tobacco: Never Used   Substance Use Topics    Alcohol use: Yes     ALLERGIES:    Allergies   Allergen Reactions    Percocet [Oxycodone-Acetaminophen]           Subjective     · Constitutional:  Negative for activity change, appetite change,unexpected weight change, chills, fever, and fatigue. · HENT: Negative for ear pain, sore throat,  Rhinorrhea, sinus pain, sinus pressure, congestion. · Eyes:  Negative for pain and discharge. · Respiratory:  Negative for chest tightness, shortness of breath, wheezing, and cough. · Cardiovascular:  Negative for chest pain, palpitations and leg swelling. · Gastrointestinal: Negative for abdominal pain, blood in stool, constipation,diarrhea, nausea and vomiting. · Endocrine: Negative for cold intolerance, heat intolerance, polydipsia, polyphagia and polyuria. · Genitourinary: Negative for difficulty urinating, dysuria, flank pain, frequency, hematuria and urgency. · Musculoskeletal: Negative for arthralgias, back pain, joint swelling, myalgias, neck pain and neck stiffness. · Skin: Negative for rash and wound. Positive for right shoulder pain, right neck pain  · Allergic/Immunologic: Negative for environmental allergies and food allergies. · Neurological:  Negative for dizziness, light-headedness, numbness and headaches. · Hematological:  Negative for adenopathy. Does not bruise/bleed easily. · Psychiatric/Behavioral: Negative for self-injury, sleep disturbance and suicidal ideas. Objective     PHYSICAL EXAM:   · Constitutional: Nasir Baez is oriented to person, place, and time.  Vital signs are normal. Appears well-developed and well-nourished. · HEENT:   · Head: Normocephalic and atraumatic. · Eyes:PERRL, EOMI, Conjunctiva normal, No discharge. · Neck: Full passive range of motion. · Cardiovascular: Normal rate, regular rhythm, S1, S2, no murmur, no gallop, no friction rub, intact distal pulses. No carotid bruit. No lower extremity edema  · Pulmonary/Chest: Breath sounds are clear throughout, No respiratory distress, No wheezing, No chest tenderness. Effort normal  Musculoskeletal: Physical Exam  Musculoskeletal:      Right shoulder: Tenderness present. No swelling, deformity, effusion, bony tenderness or crepitus. Decreased range of motion (decreased external rotation, abduction). Decreased strength. Normal pulse. Cervical back: Normal range of motion. No edema, erythema, rigidity, torticollis or crepitus. Pain with movement and muscular tenderness present. No spinous process tenderness. Normal range of motion. · Neurological: Alert and oriented to person, place, and time. Normal motor function, Normal sensory function, No focal deficits noted. He has normal strength. · Skin: Skin is warm, dry and intact. No obvious lesions on exposed skin  · Psychiatric: Normal mood and affect. Speech is normal and behavior is normal.     Nursing note and vitals reviewed. Blood pressure 116/72, pulse 76, temperature 97.2 °F (36.2 °C), temperature source Temporal, weight 190 lb (86.2 kg), SpO2 97 %. Body mass index is 32.61 kg/m². Wt Readings from Last 3 Encounters:   08/20/21 190 lb (86.2 kg)   07/12/21 187 lb (84.8 kg)   11/03/20 196 lb (88.9 kg)     BP Readings from Last 3 Encounters:   08/20/21 116/72   07/12/21 126/82   11/03/20 110/70       No results found for this visit on 08/20/21.     Completed Orders/Prescriptions   Orders Placed This Encounter   Medications    diclofenac (VOLTAREN) 50 MG EC tablet     Sig: Take 1 tablet by mouth 3 times daily as needed for Pain     Dispense:  60 tablet     Refill:  3    diclofenac sodium (VOLTAREN) 1 % GEL     Sig: Apply topically 2 times daily 2 grams per application     Dispense:  350 g     Refill:  5               AssessmentPlan/Medical Decision Making     1. Neck pain  - if no improvement with PT will need to get MRI  - reviewed red flag symptoms  - XR CERVICAL SPINE (2-3 VIEWS); Future  - diclofenac (VOLTAREN) 50 MG EC tablet; Take 1 tablet by mouth 3 times daily as needed for Pain  Dispense: 60 tablet; Refill: 3  - [facility] [place]  - diclofenac sodium (VOLTAREN) 1 % GEL; Apply topically 2 times daily 2 grams per application  Dispense: 744 g; Refill: 5  - ED notes reviewed    2. Acute pain of right shoulder  - [facility] [place]  - diclofenac sodium (VOLTAREN) 1 % GEL; Apply topically 2 times daily 2 grams per application  Dispense: 754 g; Refill: 5        Return in about 2 weeks (around 9/3/2021), or if symptoms worsen or fail to improve. 1.  Dieter Madera received counseling on the following healthy behaviors: nutrition, exercise and medication adherence  2. Patient given educational materials - see patient instructions  3. Was a self-tracking handout given in paper form or via [employer]? No  If yes, see orders or list here. 4.  Discussed use, benefit, and side effects of prescribed medications. Barriers to medication compliance addressed. All patient questions answered. Pt voiced understanding. 5.  Reviewed prior labs, imaging, consultation, follow up, and health maintenance  6. Continue current medications, diet and exercise. 7. Discussed use, benefit, and side effects of prescribed medications. Barriers to medication compliance addressed. All her questions were answered. Pt voiced understanding. Dieter Madera will continue current medications, diet and exercise.     Patient given educational materials on shoulder exercises    Of the 25 minute duration appointment visit, Adenike Gant CNP spent at least 50% of the face-to-face time in counseling, explanation of diagnosis, planning of further management, and answering all questions. Signed:  Livia Miranda, MARIBELL    This note is created with the assistance of a speech-recognition program.  While intending to generate a document that actually reflects the content of the visit, no guarantees can be provided that every mistake has been identified and corrected by editing.

## 2021-09-01 ENCOUNTER — HOSPITAL ENCOUNTER (OUTPATIENT)
Dept: PHYSICAL THERAPY | Facility: CLINIC | Age: 46
Setting detail: THERAPIES SERIES
Discharge: HOME | End: 2021-09-01
Payer: COMMERCIAL

## 2021-09-01 PROCEDURE — 97140 MANUAL THERAPY 1/> REGIONS: CPT

## 2021-09-01 PROCEDURE — 97161 PT EVAL LOW COMPLEX 20 MIN: CPT

## 2021-09-01 NOTE — CONSULTS
[] [facility]) - [facility]  [facility]  [address]  [phone]  F: [phone] [x] [address]  [facility]   [address]  P: [phone]  F: [phone] [] [address] &  Therapy  [address]  P: [phone]  F: [phone] [] [address]  P: [phone]  F: [phone] [] [address]  [facility]   [address]   [place]: [phone]  F: [phone]      Physical Therapy Spine Evaluation    Date:  2021  Patient: Sebastian Mooney  : 1975  MRN: [id number]  Physician: YUNIOR Vargas-CNP  Insurance: [employer]   Medical Diagnosis: M54.2- neck pain; M25.511- acute pain of right shoulder  Rehab Codes: M54.2; M25.511; M54.12; M62.81  Onset Date: 8/10/2021  Next 's appt.: TBD- pending success with PT services     Subjective:   CC:    Reports new onset of R flank, breast numbness/itching- no relief with act of itching. Also new onset of R scapular, chest sharp, shooting pain. Visited ED- issued Gabapentin- then referring MD switched med and issued topical cream- no relief with either. \"Kept saying C8 nerve; could be disc that's out; could be something pushing down; doesn't think it's just my shoulder; doesn't think it's from the covid shot- because it didn't get better with therapy. \"   Plans to undergo cervical spine xray tomorrow, and then will follow-up with referring MD.     Reports continued shoulder pain with activity and positioning. Reports intermittent R cervical spine pain. Soreness through R side of body. Intermittent numbness and tingling- \"comes and goes. \" x1 per day; no specific activities or positions which bring about symptoms. New onset of HA- intermittent- \"come and go. \"     No relief with recent steroid Wasting of hand muscles -   Unsteady gait -   Galan's reflex -   Hyper-reflexia -   Bladder and bowel problems- incontinence or constipation of urine or fecal matter -   Multi-segmental weakness and/or sensory disturbances -     Neoplastic Conditions Normal (-)/Abnormal (+)   Age > 48 y.o. -   Previous history of cancer -   Unexplained weight loss- >10lb in 1 week -   Constant pain -   No relief of pain with bed rest -   Night pain- not relieved with change in body position -     Upper Cervical Ligamentous Instabilities Normal (-)/Abnormal (+)   Occipital headache and numbness -   Severe limitation during neck ROM in all directions -   Signs of cervical myelopathy -     Vertebral Artery Insufficiencies- VBI Normal (-)/Abnormal (+)   Drop attack -   Dizziness/light headedness related to neck movement -   Dysphasia -   Dysarthria -   Diplopia -   Positive cranial nerve signs -     Objective:      STRENGTH  ROM    Left Right Cervical    C5 Shld Abd 4+ 4+ Flexion 60; stretch   Shld Flexion 4+ 4+ Extension 45; stretch    Shld IR 4+ 4+ Rotation L 90 R 90; discomfort and difficult   Shld ER 4+ 4+ Sidebend L 40; discomfort through R UT R 40   C6 Elb Flex   Retraction Limited- sig compensations with trunk    C7 Elb Ext       C8 EPL       T1 Fing Abd                          UE/LE                                               Sig forward shoulder sitting position- inc cervical kyphosis. B shoulder AROM- all motions- symmetrical and pain-free. Denies pain during R UE gross MMT- however- reports onset of \"pockets of pain\" immediately post MMT. L1 L IR behind the back; L3 R IR behind the back- with pain. TESTS (+/-) LEFT RIGHT Not Tested   Cerv. Comp   [x]   Cerv. Distraction RELIEF RELIEF []   Cerv. Alar/Transverse N N []   Vertebral Artery N N []   Adsons   [x]   Verle Dusky   [x]   Mallorie Tests ? Pain ?  Pain No Change Not Tested   Rep Prot [] [] [x] []   Rep Retract [] [] [x] []     No change with repeated cervical retractions in sitting- no change in R side soreness/scapula. No change in symptoms with B quadrant testing. OBSERVATION No Deficit Deficit Not Tested Comments   Posture       Forward Head [] [x] []    Rounded Shoulders [] [x] []    Kyphosis [] [x] [] Inc cervical.    Lordosis [x] [] []    Lateral Shift [x] [] []    Scoliosis [x] [] []    Slumped Sitting [] [x] [] Reports inc symptoms with upright posture for x1'. Palpation [] [x] [] Reports dec sensation upon palpation of R scapula. Sensation [] [x] []    Edema [x] [] []    Neurological [x] [] []      Functional Test: NDI  Score: 24% functionally impaired     Assessment:    Patient is a 39 y.o. pleasant female who presents to physical therapy initial evaluation with signs and symptoms consistent with potential R cervical radiculopathy. Patient demonstrates impairments and symptoms as detailed below in therapy goals. Patient currently limited in lifting, reaching, sleeping secondary to these impairments and increased symptoms. Patient would benefit from continued physical therapy services in order to address these impairments and symptoms, and return to QOL, ADLs, work. Anticipated frequency detailed above. Prognosis limited secondary to prolonged or chronic history of current condition; poor compliance to PT services during episode of care for R shoulder; no pattern or aggravating factors/positions for N/T- justifying a low complexity evaluation. If unable to achieve significant improvements within six to ten visits, will consult referring physician and consider a follow-up visit with said physician. Patient verbalized understanding and agreement to all aforementioned statements. Patient would benefit from skilled physical therapy services in order to: inc cervical spine AROM with dec symptoms; inc R UE gross strength with dec symptoms; and overall improve tolerance for reaching, lifting, and sleeping. Problems:    [x] ? Pain:  [x] ? ROM:  [x] ? Strength:  [x] ? Function:  [] Other:      STG: (to be met in 6 treatments)  1. ? Pain: Patient will report < 4/10 pain at rest and < 6/10 pain with active movement in order to improve QOL. 2. ? ROM: Will deny symptoms with R cervical rotation, L cervical SB AROM in order to improve functional mobility. 3. ? Strength: Will demo x20 seated cervical retractions with improved ROM, dec symptoms- for overall improved sitting and standing posture. Will deny symptoms post R UE MMT for improved lifting tolerance. 4. ? Function: Patient will report centralization [out of R flank, scapula, thumb], decreased intensity [< 4/10], or decreased frequency [< daily] of potential radicular symptoms in order to indicate decreased inflammation and improve ADLs and QOL. 5. Patient to be independent with home exercise program as demonstrated by performance with correct form without cues. LTG: (to be met in 10 treatments)  1. Will report improved sleeping tolerance- dec frequency of waking [< 5 times per night]; and dec symptoms with light lifting while at work for improved QOL. 2. Improve NDI to <24% impaired/limited      Patient goals: \"to feel better. \"     Rehab Potential:  [] Good  [x] Fair  [x] Poor   Suggested Professional Referral:  [] No  [x] Yes: potential back to referring MD if no success with PT services. Barriers to Goal Achievement:  [] No  [x] Yes: prolonged or chronic history of current condition; poor compliance to PT services during episode of care for R shoulder; no pattern or aggravating factors/positions for N/T. Domestic Concerns:  [x] No  [] Yes:    Pt. Education:  [x] Plans/Goals, Risks/Benefits discussed  [] Home exercise program  Method of Education: [x] Verbal  [] Demo  [] Written  Comprehension of Education:  [x] Verbalizes understanding. [] Demonstrates understanding. [x] Needs Review. [] Demonstrates/verbalizes understanding of HEP/Ed previously given.     Treatment Plan:  [x] Therapeutic Exercise   32260  [] Iontophoresis: 4 mg/mL Dexamethasone Sodium Phosphate  mAmin  66573   [x] Therapeutic Activity  57564 [] Vasopneumatic cold with compression  50977    [] Gait Training   60153 [] Ultrasound   11187   [] Neuromuscular Re-education  81319 [] Electrical Stimulation Unattended  21085   [x] Manual Therapy  85728 [] Electrical Stimulation Attended  32331   [x] Instruction in HEP  [x] Lumbar/Cervical Traction  82256   [] Aquatic Therapy   59979 [x] Cold/hotpack    [] Massage   78967      [] Dry Needling, 1 or 2 muscles  62674   [] Biofeedback, first 15 minutes   95447  [] Biofeedback, additional 15 minutes   82756 [] Dry Needling, 3 or more muscles  79610     []  Medication allergies reviewed for use of    Dexamethasone Sodium Phosphate 4mg/ml     with iontophoresis treatments. Pt is not allergic. Frequency: 2 x/week for 10 visits    Todays Treatment:  Modalities: HP, CP to cervical spine/R scapula- PRN  Precautions: potential R cervical radiculopathy- only relief with manual cervical traction thus far; intermittent N/T through R flank/scapula/thumb- no pattern; recent PT services for R shoulder pain- but no longer deficits in R shoulder AROM/strength   Exercises:  Exercise Reps/ Time Weight/ Level Comments                                 Other:    MT: general, manual cervical traction- gr. III- for approximately x10' with relief of R scapular symptoms. Specific Instructions for next treatment: Expand manual cervical traction- may consider mechanical cervical traction in future. May also trial supine cervical retractions over x2 pillows; prone retractions, depressions; and posture education next visit.      Evaluation Complexity:  History (Personal factors, comorbidities) [] 0 [x] 1-2 [] 3+   Exam (limitations, restrictions) [] 1-2 [x] 3 [] 4+   Clinical presentation (progression) [x] Stable [] Evolving  [] Unstable   Decision Making [x] Low [] Moderate [] High    [x] Low Complexity [] Moderate Complexity [] High Complexity     Treatment Charges: Mins Units   [x] Evaluation       [x]  Low       []  Moderate       []  High 48 1   []  Modalities     []  Ther Exercise     [x]  Manual Therapy 10 1   []  Ther Activities     []  Aquatics     []  Vasocompression     []  Other       TOTAL TREATMENT TIME: 58    Time in: 4:52PM      Time out: 5:50PM    Electronically signed by: Kyra Sullivan PT

## 2021-09-01 NOTE — FLOWSHEET NOTE
[] [facility]) - [facility] &  Therapy  [address]  [phone]  F: [phone] [x] [address] [facility] 36   [address]  P: [phone]  F: [phone] [] [address]  [address] [facility]  P: [phone]  F: [phone] [] [address]  [facility]   [address]   [place]: [phone]  F: [phone]     THERAPY RESPONSIBILITY OF CARE TRANSFER FORM       PATIENT NAME: Grace Martinez  MRN: [id number]   : 1975      TRANSFERRING FACILITY:    [] [facility]   [] [facility]   [x]  [facility]   [] [facility]   [] [facility]   [] [facility]   [] [facility]  [] [place]   [] Other:       ACCEPTING FACILITY   [] [facility]   [] [facility]   [x]  [facility]   [] [facility]   [] [facility]   [] [facility]   [] [facility]  [] [place]   [] Other:        REASON FOR TRANSFER: pt needs evenings- other than . TRANSFER OF CARE:    I am transferring the care of the above patient to: Jose Callejas, MISSY [place] PT  2021      ACCEPTANCE OF CARE:     I am accepting the care of the above patient.  Jose Callejas, PT

## 2021-09-09 ENCOUNTER — HOSPITAL ENCOUNTER (OUTPATIENT)
Dept: PHYSICAL THERAPY | Facility: CLINIC | Age: 46
Setting detail: THERAPIES SERIES
Discharge: HOME | End: 2021-09-09
Payer: COMMERCIAL

## 2021-09-09 PROCEDURE — 97110 THERAPEUTIC EXERCISES: CPT

## 2021-09-09 PROCEDURE — 97012 MECHANICAL TRACTION THERAPY: CPT

## 2021-09-09 NOTE — FLOWSHEET NOTE
[] [address] [facility] &  Therapy  [address]  [phone]  F: [phone] [x] [address]  [employer] [address]   [address]  P: [phone]  F: [phone] [] [address] &  Therapy  [address] [address]  P: [phone]  F: [phone] [] [address]  P: [phone]  F: [phone] [] [address]  [facility]   [address]   [place]: [phone]  F: [phone]      Physical Therapy Daily Treatment Note    Date:  2021  Patient Name:  Eugenio Van    :  1975  MRN: [id number]  Physician: YNUIOR Valdes-MARIBELL                       Insurance: [employer]   Medical Diagnosis: M54.2- neck pain; M25.511- acute pain of right shoulder                     Rehab Codes: M54.2; M25.511; M54.12; M62.81  Onset Date: 8/10/2021                       Next 's appt.: TBD- pending success with PT services   Visit# / total visits: 2/10   Cancels/No Shows: 0/0    Subjective:    Pain:  [x] Yes  [] No Location: right side neck  Pain Rating: (0-10 scale) 3/10  Pain altered Tx:  [x] No  [] Yes  Action:     Comments: Pt reports not doing her x rays due to money issues. Reports her pain is dull in her back into her shoulder and neck.     Objective:  Modalities:   Precautions: potential R cervical radiculopathy- only relief with manual cervical traction thus far; intermittent N/T through R flank/scapula/thumb- no pattern; recent PT services for R shoulder pain- but no longer deficits in R shoulder AROM/strength   Exercises:  Exercise Reps/ Time Weight/ Level Comments             Supine           Cervical retraction 10x5\"   Added 21   Scapular retraction  10x5\"   Added 21   Shoulder retro  10x  Added 21   Door pec stretch   20\"x3    Added 21 Seated      Lateral lean stretch  15\"x4  Added 9/9/21 over bolster                Prone      Scap reatraction 10x5\"  Added 9/9/21   scap depression 10x5\"  Added 9/9/21   Scap dep and Ret 10x5\"  Added 9/9/21         POSTURE EDU x 5 min Included in TE   Other:     MT: general, manual cervical traction- gr. III- for approximately x10' with relief of R scapular symptoms. (not today)   Cervical mechanical traction: 15 min 12# on 4# off      Specific Instructions for next treatment:       Treatment Charges: Mins Units   [x]  Modalities: traction 15 1   [x]  Ther Exercise 25 2   []  Manual Therapy     []  Ther Activities     []  Aquatics     []  Vasocompression     []  Other     Total Treatment time 40 3       Assessment: [x] Progressing toward goals. Started treatment with exercises and thorough postural education which will likely need review. Patient fatigued after 20 minutes of exercises and stretching, therefore mechanical traction trailed at this time. Pt found it to be beneficial but feels she may prefer the manual. Pt received updated HEP and review of this as well. [] No change. [] Other:    [x] Patient would continue to benefit from skilled physical therapy services in order to:inc cervical spine AROM with dec symptoms; inc   R UE gross strength with dec symptoms; and overall improve tolerance for reaching, lifting, and sleeping. STG: (to be met in 6 treatments)  1. ? Pain: Patient will report < 4/10 pain at rest and < 6/10 pain with active movement in order to improve QOL. 2. ? ROM: Will deny symptoms with R cervical rotation, L cervical SB AROM in order to improve functional mobility. 3. ? Strength: Will demo x20 seated cervical retractions with improved ROM, dec symptoms- for overall improved sitting and standing posture. Will deny symptoms post R UE MMT for improved lifting tolerance. 4. ?  Function: Patient will report centralization [out of R flank, scapula, thumb], decreased intensity [< 4/10], or decreased frequency [< daily] of potential radicular symptoms in order to indicate decreased inflammation and improve ADLs and QOL. 5. Patient to be independent with home exercise program as demonstrated by performance with correct form without cues. LTG: (to be met in 10 treatments)  1. Will report improved sleeping tolerance- dec frequency of waking [< 5 times per night]; and dec symptoms with light lifting while at work for improved QOL. 2. Improve NDI to <24% impaired/limited       Patient goals: \"to feel better. \"     Pt. Education:  [x] Yes  [] No  [x] Reviewed Prior HEP/Ed  Method of Education: [x] Verbal  [x] Demo  [x] Written- Updated HEP  Access Code: [email or web address]  URL: [email or web address]  Date: 09/09/2021  Prepared by: Chip Ralph    Exercises  Supine Suboccipital Release with Tennis Balls - 1 x daily - 7 x weekly - 3 sets - 10 reps  Supine Cervical Retraction with Towel - 1 x daily - 7 x weekly - 3 sets - 10 reps  Doorway Pec Stretch at 90 Degrees Abduction - 1 x daily - 7 x weekly - 3 sets - 10 reps  Prone Scapular Retraction - 1 x daily - 7 x weekly - 3 sets - 10 reps    Comprehension of Education:  [x] Verbalizes understanding. [] Demonstrates understanding. [x] Needs review. [] Demonstrates/verbalizes HEP/Ed previously given. Plan: [x] Continue current frequency toward long and short term goals.     [x] Specific Instructions for subsequent treatments: check response to [facility] traction      Time In: 6:30 pm           Time Out: 7:20 pm    Electronically signed by:  Jeffery Tran PTA

## 2021-09-14 ENCOUNTER — HOSPITAL ENCOUNTER (OUTPATIENT)
Dept: PHYSICAL THERAPY | Facility: CLINIC | Age: 46
Setting detail: THERAPIES SERIES
Discharge: HOME | End: 2021-09-14
Payer: COMMERCIAL

## 2021-09-14 PROCEDURE — 97110 THERAPEUTIC EXERCISES: CPT

## 2021-09-14 PROCEDURE — 97012 MECHANICAL TRACTION THERAPY: CPT

## 2021-09-14 NOTE — FLOWSHEET NOTE
[] [facility]) - [facility] &  Therapy  [address]  [phone]  F: [phone] [x] [address]  [address]   [address]  P: [phone]  F: [phone] [] [address] &  Therapy  [address] [facility]  P: [phone]  F: [phone] [] [address]  P: [phone]  F: [phone] [] [address]  [facility]   [address]   [place]: [phone]  F: [phone]      Physical Therapy Daily Treatment Note    Date:  2021  Patient Name:  Stacia Doe    :  1975  MRN: [id number]  Physician: YUNIOR Staley-MARIBELL                       Insurance: Shortlist   Medical Diagnosis: M54.2- neck pain; M25.511- acute pain of right shoulder                     Rehab Codes: M54.2; M25.511; M54.12; M62.81  Onset Date: 8/10/2021                       Next 's appt.: TBD- pending success with PT services   Visit# / total visits: 3/10   Cancels/No Shows: 0/1    Subjective:    Pain:  [x] Yes  [] No Location: right side neck  Pain Rating: (0-10 scale) 4/10  Pain altered Tx:  [x] No  [] Yes  Action:     Comments: Pt enters with increased pain, she reports she has been in constant pain for the last few days. She did feel a little better after last visit. She does not get any relief with medication. Still has not gotten x-rays done. .    Objective:  Modalities: Cervical mechanical traction: 15 min 13# on 6# off increased   Precautions: potential R cervical radiculopathy- only relief with manual cervical traction thus far; intermittent N/T through R flank/scapula/thumb- no pattern; recent PT services for R shoulder pain- but no longer deficits in R shoulder AROM/strength   Exercises:  Exercise Reps/ Time Weight/ Level Comments             Supine         report centralization [out of R flank, scapula, thumb], decreased intensity [< 4/10], or decreased frequency [< daily] of potential radicular symptoms in order to indicate decreased inflammation and improve ADLs and QOL. 5. Patient to be independent with home exercise program as demonstrated by performance with correct form without cues. LTG: (to be met in 10 treatments)  1. Will report improved sleeping tolerance- dec frequency of waking [< 5 times per night]; and dec symptoms with light lifting while at work for improved QOL. 2. Improve NDI to <24% impaired/limited       Patient goals: \"to feel better. \"     Pt. Education:  [x] Yes  [] No  [x] Reviewed Prior HEP/Ed  Method of Education: [x] Verbal  [x] Demo charted exercises [] Written-  Access Code: [email or web address]  URL: [email or web address]/  Date: 09/09/2021  Prepared by: Zafar Barney    Exercises  Supine Suboccipital Release with Tennis Balls - 1 x daily - 7 x weekly - 3 sets - 10 reps  Supine Cervical Retraction with Towel - 1 x daily - 7 x weekly - 3 sets - 10 reps  Doorway Pec Stretch at 90 Degrees Abduction - 1 x daily - 7 x weekly - 3 sets - 10 reps  Prone Scapular Retraction - 1 x daily - 7 x weekly - 3 sets - 10 reps    Comprehension of Education:  [x] Verbalizes understanding. [] Demonstrates understanding. [x] Needs review. [] Demonstrates/verbalizes HEP/Ed previously given. Plan: [x] Continue current frequency toward long and short term goals.     [x] Specific Instructions for subsequent treatments: check response to [facility] traction      Time In: 4:45 pm           Time Out: 5:27 pm    Electronically signed by:  Mendy Newton PTA

## 2021-09-15 ENCOUNTER — APPOINTMENT (OUTPATIENT)
Dept: PHYSICAL THERAPY | Facility: CLINIC | Age: 46
End: 2021-09-15
Payer: COMMERCIAL

## 2021-09-16 ENCOUNTER — HOSPITAL ENCOUNTER (OUTPATIENT)
Dept: PHYSICAL THERAPY | Facility: CLINIC | Age: 46
Setting detail: THERAPIES SERIES
Discharge: HOME | End: 2021-09-16
Payer: COMMERCIAL

## 2021-09-16 PROCEDURE — 97012 MECHANICAL TRACTION THERAPY: CPT

## 2021-09-16 PROCEDURE — 97110 THERAPEUTIC EXERCISES: CPT

## 2021-09-16 NOTE — FLOWSHEET NOTE
Added 9/9/21 over bolster    scap retraction  10x5\"     Retro shoulder rolls 20x     Cervical retraction  10x5\"     Upper Trapezius Stretch 3x20\"ea  Added 9/16               Prone      Scap reatraction 10x5\"  Added 9/9/21   scap depression 10x5\"  Added 9/9/21   Scap dep and Ret 10x5\"  Added 9/9/21         Standing       Doorway stretch 3x30\"     Tband rows 2x10 orange Added 9/16   Tband ext 2x10 orange Added 9/16         POSTURE EDU x 5 min Included in TE   Other:     MT: general, manual cervical traction- gr. III- for approximately x10' with relief of R scapular symptoms. (not today)         Specific Instructions for next treatment:       Treatment Charges: Mins Units   [x]  Modalities: traction 15 1   [x]  Ther Exercise 28 2   []  Manual Therapy     []  Ther Activities     []  Aquatics     []  Vasocompression     []  Other     Total Treatment time 43 3       Assessment: [x] Progressing toward goals. Started today with mechanica traction due to positive relief of symptoms following previous visit. Positive relief of symptoms following manual traction. Continued with exercises per chart adding seated upper trapezius stretch, theraband rows and extensions. Pt complained of slight neck soreness with upper trapezius stretch, advised to remain within pain free range. Pt reports soreness through  R shoulder during theraband exercises which improved with cueing to emphasize scapular retraction and depression rather than shoulder extension. Arm pain was reduced with cueing. Overall good tolerance to exercises however pt continues to demonstrate fatigue with scapular strengthening this date. [] No change. [] Other:    [x] Patient would continue to benefit from skilled physical therapy services in order to:inc cervical spine AROM with dec symptoms; inc R UE gross strength with dec symptoms; and overall improve tolerance for reaching, lifting, and sleeping.      STG: (to be met in 6 treatments)  1. ? Pain: Patient will monitor response to mechanical traction       Time In: 6:02 pm          Time Out: 6:45 pm    Electronically signed by:   Sharonda Van PT

## 2021-09-20 ENCOUNTER — HOSPITAL ENCOUNTER (OUTPATIENT)
Dept: PHYSICAL THERAPY | Facility: CLINIC | Age: 46
Setting detail: THERAPIES SERIES
Discharge: HOME | End: 2021-09-20
Payer: COMMERCIAL

## 2021-09-20 NOTE — FLOWSHEET NOTE
[] [address]  [address]    [phone]  F: [phone]   [x] [address]  [facility] 36   [address]  P: [phone]  F: [phone]  [] [address] &  Therapy  [address] [facility]  P: [phone]  F: [phone] [] [address]  P: [phone]  F: [phone]  [] [address]  [address] [facility] [address]   [address]   [place]: [phone]  F: [phone]   [] [facility]  [address] [facility] [address]  [place]: [phone]   F: [phone]     Physical Therapy Cancel/No Show note    Date: 2021  Patient: Estuardo Saha  : 1975  MRN: [id number]    Cancels/No Shows to date:     For today's appointment patient:    [x]  Cancelled    [] Rescheduled appointment    [] No-show     Reason given by patient:    []  Patient ill    []  Conflicting appointment    [] No transportation      [] Conflict with work    [] No reason given    [] Weather related    [] COVID-19    [x] Other:      Comments: family emergency.        [x] Next appointment was confirmed    Electronically signed by: Alicia Briggs PTA

## 2021-09-23 ENCOUNTER — HOSPITAL ENCOUNTER (OUTPATIENT)
Dept: PHYSICAL THERAPY | Facility: CLINIC | Age: 46
Setting detail: THERAPIES SERIES
Discharge: HOME | End: 2021-09-23
Payer: COMMERCIAL

## 2021-09-23 NOTE — FLOWSHEET NOTE
[] [facility]) - [facility] &  Therapy  [address]    [phone]  F: [phone]   [x] [address]  [facility] 36   [address]  P: [phone]  F: [phone]  [] [place]  [address] [facility] [address]  P: [phone]  F: [phone] [] [address]  P: [phone]  F: [phone]  [] [address]  [facility]   [address]   [place]: [phone]  F: [phone]   [] [place] [address] [facility] [address]  [place]: [phone]   F: [phone]     Physical Therapy Cancel/No Show note    Date: 2021  Patient: Miya Clement  : 1975  MRN: [id number]    Cancels/No Shows to date:     For today's appointment patient:    [x]  Cancelled    [] Rescheduled appointment    [] No-show     Reason given by patient:    []  Patient ill    []  Conflicting appointment    [] No transportation      [] Conflict with work    [] No reason given    [] Weather related    [] COVID-19    [x] Other: car trouble       Comments:        [x] Next appointment was confirmed    Electronically signed by: Leanne Campoverde PTA

## 2021-09-30 ENCOUNTER — HOSPITAL ENCOUNTER (OUTPATIENT)
Dept: PHYSICAL THERAPY | Facility: CLINIC | Age: 46
Setting detail: THERAPIES SERIES
Discharge: HOME | End: 2021-09-30
Payer: COMMERCIAL

## 2021-09-30 PROCEDURE — 97140 MANUAL THERAPY 1/> REGIONS: CPT

## 2021-09-30 PROCEDURE — 97110 THERAPEUTIC EXERCISES: CPT

## 2021-09-30 NOTE — FLOWSHEET NOTE
[] [address]  Outpatient Rehabilitation &  Therapy  [address]  [phone]  F: [phone] [x] [address]  [facility] 36   [address]  P: [phone]  F: [phone] [] [address] &  Therapy  [address] [facility] [address]  P: [phone]  F: [phone] [] [address]  P: [phone]  F: [phone] [] [address]  [facility]   [address]   [place]: [phone]  F: [phone]      Physical Therapy Daily Treatment Note    Date:  2021  Patient Name:  Jayesh Galvez    :  1975  MRN: [id number]  Physician: YUNIOR Garnica-MARIBELL                       [facility]   Medical Diagnosis: M54.2- neck pain; M25.511- acute pain of right shoulder                     Rehab Codes: M54.2; M25.511; M54.12; M62.81  Onset Date: 8/10/2021                       Next 's appt.: TBD- pending success with PT services   Visit# / total visits: 5/10   Cancels/No Shows:     Subjective:    Pain:  [x] Yes  [] No Location: right side neck  Pain Rating: (0-10 scale) 0/10  Pain altered Tx:  [x] No  [] Yes  Action:     Comments: Pt arrives with no pain or headaches, continued tightness right side of neck    Objective:  Modalities: Cervical mechanical traction: 15 min 13# on 6# off increased   Manual cervical traction 2021  Precautions: potential R cervical radiculopathy- only relief with manual cervical traction thus far; intermittent N/T through R flank/scapula/thumb- no pattern; recent PT services for R shoulder pain- but no longer deficits in R shoulder AROM/strength   Exercises:  Exercise Reps/ Time Weight/ Level Comments             Supine           Cervical retraction 10x5\"   Added 21         Seated      Lateral lean stretch  15\"x4  Added 21 over bolster    scap retraction  10x5\"     Retro shoulder rolls 20x     Cervical retraction  10x5\"     Upper Trapezius Stretch 3x20\"ea  Added 9/16               Prone      Scap reatraction 10x5\"  Added 9/9/21   scap depression 10x5\"  Added 9/9/21   Scap dep and Ret 10x5\"  Added 9/9/21         Standing       Doorway stretch 3x30\"     Tband rows 2x10 orange Added 9/16   Tband ext 2x10 orange Added 9/16         POSTURE EDU x 5 min Included in TE   Other:     MT: general, manual cervical traction- gr. III- for approximately x10' with relief of R scapular symptoms. (not today)         Specific Instructions for next treatment:       Treatment Charges: Mins Units   []  Modalities: traction     [x]  Ther Exercise 30 2   [x]  Manual Therapy 15 1   []  Ther Activities     []  Aquatics     []  Vasocompression     []  Other     Total Treatment time 45 3       Assessment: [x] Progressing toward goals. Patient reported similar relief with mechanical and manual traction. Performed manual traction today with included upper trap stretching. Doing well with exercises, re educated on chin tucks, was doing cervical extension, able to corret. [] No change. [] Other:    [x] Patient would continue to benefit from skilled physical therapy services in order to:inc cervical spine AROM with dec symptoms; inc R UE gross strength with dec symptoms; and overall improve tolerance for reaching, lifting, and sleeping. STG: (to be met in 6 treatments)  1. ? Pain: Patient will report < 4/10 pain at rest and < 6/10 pain with active movement in order to improve QOL. met 9-  2. ? ROM: Will deny symptoms with R cervical rotation, L cervical SB AROM in order to improve functional mobility. 3. ? Strength: Will demo x20 seated cervical retractions with improved ROM, dec symptoms- for overall improved sitting and standing posture. Will deny symptoms post R UE MMT for improved lifting tolerance. 4. ?  Function: Patient will report centralization [out of R flank, scapula, thumb], decreased intensity [< 4/10], or decreased frequency [< daily] of potential radicular symptoms in order to indicate decreased inflammation and improve ADLs and QOL. 5. Patient to be independent with home exercise program as demonstrated by performance with correct form without cues. LTG: (to be met in 10 treatments)  1. Will report improved sleeping tolerance- dec frequency of waking [< 5 times per night]; and dec symptoms with light lifting while at work for improved QOL. 2. Improve NDI to <24% impaired/limited       Patient goals: \"to feel better. \"     Pt. Education:  [x] Yes  [] No  [x] Reviewed Prior HEP/Ed  Method of Education: [x] Verbal  [x] Demo charted exercises [] Written-  Access Code: [email or web address]  URL: [email or web address]. com/  Date: 09/09/2021  Prepared by: Nidia Cannon    Exercises  Supine Suboccipital Release with Tennis Balls - 1 x daily - 7 x weekly - 3 sets - 10 reps  Supine Cervical Retraction with Towel - 1 x daily - 7 x weekly - 3 sets - 10 reps  Doorway Pec Stretch at 90 Degrees Abduction - 1 x daily - 7 x weekly - 3 sets - 10 reps  Prone Scapular Retraction - 1 x daily - 7 x weekly - 3 sets - 10 reps    Comprehension of Education:  [] Verbalizes understanding. [] Demonstrates understanding. [x] Needs review. [x] Demonstrates/verbalizes HEP/Ed previously given. Plan: [x] Continue current frequency toward long and short term goals.     [x] Specific Instructions for subsequent treatments: monitor response to mechanical traction       Time In: 5:14 pm   (therapist running late)     Time Out: 6:05 pm    Electronically signed by:  Wilfredo Booth, PT

## 2021-10-23 ENCOUNTER — HOSPITAL ENCOUNTER (OUTPATIENT)
Age: 46
Discharge: HOME | End: 2021-10-25
Payer: COMMERCIAL

## 2021-10-23 ENCOUNTER — HOSPITAL ENCOUNTER (OUTPATIENT)
Dept: GENERAL RADIOLOGY | Age: 46
Discharge: HOME | End: 2021-10-25
Payer: COMMERCIAL

## 2021-10-23 DIAGNOSIS — M54.2 NECK PAIN: ICD-10-CM

## 2021-10-23 PROCEDURE — 72040 X-RAY EXAM NECK SPINE 2-3 VW: CPT

## 2021-10-25 DIAGNOSIS — M54.2 NECK PAIN: Primary | ICD-10-CM

## 2021-12-27 ENCOUNTER — OFFICE VISIT (OUTPATIENT)
Dept: NEUROSURGERY | Age: 46
End: 2021-12-27
Payer: COMMERCIAL

## 2021-12-27 VITALS
TEMPERATURE: 97.8 F | DIASTOLIC BLOOD PRESSURE: 81 MMHG | WEIGHT: 186.3 LBS | HEART RATE: 80 BPM | BODY MASS INDEX: 31.98 KG/M2 | SYSTOLIC BLOOD PRESSURE: 114 MMHG | RESPIRATION RATE: 16 BRPM

## 2021-12-27 DIAGNOSIS — M54.12 CERVICAL RADICULOPATHY: Primary | ICD-10-CM

## 2021-12-27 DIAGNOSIS — G89.29 CHRONIC RIGHT SHOULDER PAIN: ICD-10-CM

## 2021-12-27 DIAGNOSIS — M25.511 CHRONIC RIGHT SHOULDER PAIN: ICD-10-CM

## 2021-12-27 PROCEDURE — 99203 OFFICE O/P NEW LOW 30 MIN: CPT | Performed by: NURSE PRACTITIONER

## 2021-12-27 RX ORDER — M-VIT,TX,IRON,MINS/CALC/FOLIC 27MG-0.4MG
1 TABLET ORAL DAILY
COMMUNITY

## 2021-12-27 RX ORDER — PREDNISONE 20 MG/1
TABLET ORAL
Qty: 30 TABLET | Refills: 0 | Status: SHIPPED | OUTPATIENT
Start: 2021-12-27 | End: 2022-01-11

## 2021-12-27 NOTE — PROGRESS NOTES
[address], [address] [facility] [address]  Dept: [phone]    Patient:  Evaristo Chavez  YOB: 1975  Date: 12/27/21    The patient is a 55 y.o. female who presents today for consult of the following problems:     Chief Complaint   Patient presents with    New Patient    Neck Pain         HPI:     Evaristo Chavez is a 55 y.o. female on whom neurosurgical consultation was requested by YUNIOR Rowland CNP for management of neck pain and arm symptoms. Symptoms started in April/May with right shoulder pain, no obvious precipitating factors. Was evaluated in June, and did have some XRs complete of shoulder that were reportedly unremarkable. Subsequently started PT. Subsequently developed pain that started radiating from neck down right arm, has been having intermittent numbness and tingling as well as weakness to right arm. Pain radiates down rightt arm, bicep, and down into ring, middle fingers. Has tried heat, ice, physical therapy, as well as very OTC and prescription oral and topical medications. Pain is severe, constant, interferes with sleep, daily activities. Has not yet found any significant palliating factors. Nocturnal Awakening: Yes  Reason: pain      LIMITATIONS    Pain significantly limiting on a daily basis   Daily pharmacologic pain control include: NSAIDs, topical and oral  Neck : Arm pain: all arm pain    MANAGEMENT     Prior Surgery: No  Prior to 1yr ago:   In the last year:    Physical Therapy: Yes   Chiropractic Interventions: No   Injections: No  Improvement: n/a    Injections/response: n/a    History:     Past Medical History:   Diagnosis Date    Allergic rhinitis     Anxiety     Depression     Hypothyroid     Irritable bowel syndrome      Past Surgical History:   Procedure Laterality Date    ENDOMETRIAL ABLATION      HYSTERECTOMY      OTHER SURGICAL HISTORY Right 08/09/2016 excision of axillary soft tissue mass with biopsy    THYROID SURGERY      benign mass    WRIST SURGERY      rt wrist tendon repair     Family History   Problem Relation Age of Onset    Diabetes Mother     Heart Disease Mother     High Blood Pressure Mother     High Cholesterol Mother     Depression Mother     High Blood Pressure Father     High Cholesterol Father     Alcohol Abuse Father     Asthma Father     High Blood Pressure Brother      Current Outpatient Medications on File Prior to Visit   Medication Sig Dispense Refill    Multiple Vitamins-Minerals (THERAPEUTIC MULTIVITAMIN-MINERALS) tablet Take 1 tablet by mouth daily      levothyroxine (SYNTHROID) 175 MCG tablet Take 1 tablet by mouth Daily 30 tablet 11    gabapentin (NEURONTIN) 300 MG capsule TAKE 1 CAPSULE BY MOUTH THREE TIMES A DAY FOR 3 DAYS (Patient not taking: Reported on 8/20/2021)      diclofenac (VOLTAREN) 50 MG EC tablet Take 1 tablet by mouth 3 times daily as needed for Pain (Patient not taking: Reported on 12/27/2021) 60 tablet 3    diclofenac sodium (VOLTAREN) 1 % GEL Apply topically 2 times daily 2 grams per application (Patient not taking: Reported on 12/27/2021) 350 g 5    naproxen (EC NAPROSYN) 500 MG EC tablet Take 500 mg by mouth 2 times daily (with meals) (Patient not taking: Reported on 8/20/2021)      FLUoxetine (PROZAC) 40 MG capsule Take 40 mg by mouth daily (Patient not taking: Reported on 12/27/2021)      busPIRone (BUSPAR) 10 MG tablet Take 10 mg by mouth 3 times daily (Patient not taking: Reported on 12/27/2021)       No current facility-administered medications on file prior to visit. Social History     Tobacco Use    Smoking status: Never Smoker    Smokeless tobacco: Never Used   Substance Use Topics    Alcohol use:  Yes    Drug use: No       Allergies   Allergen Reactions    Percocet [Oxycodone-Acetaminophen]        Review of Systems  Constitutional: Negative for activity change and appetite R dorsiflexion 5/5  L Plantarflexion 5/5; R plantarflexion 5/5  L EHL 5/5; R EHL 5/5    Reflexes  L Brachioradialis 2+/4; R brachioradialis 2+/4  L Biceps 2+/4; R Biceps 2+/4  L Triceps 2+/4; R Triceps 2+/4  L Patellar 2+/4: R Patellar 2+/4  L Achilles 2+/4; R Achilles 2+/4    hoffmans L: neg  hoffmans R: neg  Clonus L: neg  Clonus R: neg  Babinski L: neg  Babinski R: neg    Spurlings: No  Lhermittes: No    Tinels at elbow: No  Tinels at wrist: No  Phalens: No  Ok sign: No  Froments: No  Benedictine Hand: No    Atrophy of thenar: No  Atrophy of hypothenar: No    + Increased pain with active and passive shoulder elevation, external rotation, abduction    Studies Review:     X-ray cervical spine 10/23/2021 (images reviewed): FINDINGS:   Skull base through cervicothoracic junction is visualized.  Cervical   vertebrae demonstrate normal height and alignment.  No fracture, subluxation,   or suspicious osseous lesion identified.  There is mild multilevel disc   height loss with degenerative endplate spurring, most pronounced at C5-C6. Base of the odontoid is intact.  Prevertebral soft tissues and visualized   lung apices are without acute process. PCP notes reviewed  Physical therapy notes reviewed    Assessment and Plan:      1. Cervical radiculopathy    2. Chronic right shoulder pain          Plan: Patient with persistent right upper extremity pain, numbness/tingling for the last several months. Has undergone x-rays of both cervical spine as well as shoulder, as well as multiple rounds of physical therapy with no relief from symptoms. Pain is worsened with shoulder movement. Recommend proceeding with MRI cervical spine as well as MRI right shoulder. Will provide prednisone taper. Patient to return after completion of MRIs. Followup: No follow-ups on file.     Prescriptions Ordered:  Orders Placed This Encounter   Medications    predniSONE (DELTASONE) 20 MG tablet     Sig: Take 3 tablets PO daily for first 5 days, then take 2 tablets PO daily for next 5 days, then take 1 tablet PO daily for last 5 days     Dispense:  30 tablet     Refill:  0        Orders Placed:  Orders Placed This Encounter   Procedures    MRI CERVICAL SPINE WO CONTRAST     Standing Status:   Future     Standing Expiration Date:   12/27/2022     Order Specific Question:   Reason for exam:     Answer:   RUE radiculopathy; failed PT    MRI SHOULDER RIGHT WO CONTRAST     Standing Status:   Future     Standing Expiration Date:   12/27/2022     Order Specific Question:   Reason for exam:     Answer:   right shoulder pain; failed PT measures        Electronically signed by YUNIOR Hampton CNP on 12/27/2021 at 10:34 AM    Please note that this chart was generated using voice recognition Dragon dictation software. Although every effort was made to ensure the accuracy of this automated transcription, some errors in transcription may have occurred.

## 2022-01-03 ENCOUNTER — HOSPITAL ENCOUNTER (OUTPATIENT)
Dept: MRI IMAGING | Facility: CLINIC | Age: 47
Discharge: HOME | End: 2022-01-05
Payer: COMMERCIAL

## 2022-01-03 ENCOUNTER — HOSPITAL ENCOUNTER (OUTPATIENT)
Facility: CLINIC | Age: 47
Discharge: HOME | End: 2022-01-05
Payer: COMMERCIAL

## 2022-01-03 DIAGNOSIS — M25.511 CHRONIC RIGHT SHOULDER PAIN: ICD-10-CM

## 2022-01-03 DIAGNOSIS — M54.12 CERVICAL RADICULOPATHY: ICD-10-CM

## 2022-01-03 DIAGNOSIS — G89.29 CHRONIC RIGHT SHOULDER PAIN: ICD-10-CM

## 2022-01-03 PROCEDURE — 72141 MRI NECK SPINE W/O DYE: CPT

## 2022-01-03 PROCEDURE — 73221 MRI JOINT UPR EXTREM W/O DYE: CPT

## 2022-01-06 ENCOUNTER — TELEPHONE (OUTPATIENT)
Dept: NEUROSURGERY | Age: 47
End: 2022-01-06

## 2022-01-06 DIAGNOSIS — M25.511 CHRONIC RIGHT SHOULDER PAIN: Primary | ICD-10-CM

## 2022-01-06 DIAGNOSIS — R93.6 ABNORMAL MRI, SHOULDER: ICD-10-CM

## 2022-01-06 DIAGNOSIS — G89.29 CHRONIC RIGHT SHOULDER PAIN: Primary | ICD-10-CM

## 2022-01-06 NOTE — TELEPHONE ENCOUNTER
Called patient, no answer. MRI of her cervical spine is normal.  There are some abnormalities in her shoulder MRI showing some tearing, as well as some abnormal areas in the bone. I have placed a referral for an orthopedic specialist, Dr. Cyndie Covington, for further evaluation. She should be able to see this in my chart.

## 2022-01-06 NOTE — TELEPHONE ENCOUNTER
Patient would like her results:     MRI shoulder:     Impression   1. Indeterminate lesions in the proximal humeral diaphysis measuring 1.4 and   1.7 cm.  Further evaluation with nuclear medicine whole-body bone scan is   recommended.  Metastatic disease difficult to exclude in the appropriate   clinical setting.  Further evaluation with right shoulder MRI without and   with contrast and T1 fat suppression is recommended to evaluate for any   abnormal enhancement.  Correlation with plain radiographs also recommended. 2. Mild degenerative change of the right AC joint. 3. Focal tearing at the superior labrum with mild underlying labral   degeneration. 4. Low-grade partial-thickness articular-surface and interstitial tearing of   mid and posterior supraspinatus and anterior superior infraspinatus near the   footplate.  Mild underlying tendinosis. The findings were sent to the Radiology Results [address] at 12:03   pm on 1/3/2022 to be communicated to a licensed caregiver.      MRI Cervical Spine:  Impression   Unremarkable cervical spine MRI.       RECOMMENDATIONS:   Unavailable

## 2022-01-17 ENCOUNTER — OFFICE VISIT (OUTPATIENT)
Dept: ORTHOPEDIC SURGERY | Age: 47
End: 2022-01-17
Payer: COMMERCIAL

## 2022-01-17 VITALS — BODY MASS INDEX: 31.76 KG/M2 | HEIGHT: 64 IN | WEIGHT: 186 LBS

## 2022-01-17 DIAGNOSIS — M25.511 RIGHT SHOULDER PAIN, UNSPECIFIED CHRONICITY: Primary | ICD-10-CM

## 2022-01-17 PROCEDURE — 99203 OFFICE O/P NEW LOW 30 MIN: CPT | Performed by: ORTHOPAEDIC SURGERY

## 2022-01-17 NOTE — PROGRESS NOTES
Orthopedic Shoulder Encounter Note     Chief complaint: right shoulder pain    HPI: Ld Jacobs is a 55 y.o.  left-hand dominant female who presents for evaluation of her right shoulder. She indicates that she's been hurting for about 8 months without precipitating trauma or injury. She noticed it about a week after receiving her COVID vaccination. Her pain varies in location but is fairly constant. It's worse with activity/use. She does feel weak and at times stiff. She denies having any fevers, chills, sweats. Sleeping at night is \"horrible\". Previous treatment:    NSAIDs: Voltaren, Naproxen    Physical Therapy:  Yes (for 12 weeks)    Injections: None    Surgeries: None    Review of Systems:   Constitutional: Negative for fever, chills, sweats. Pain Score:  10 - Worst pain ever  Neurological: Negative for headache, numbness, or weakness. Musculoskeletal: As noted in HPI     Past Medical History  Alissa Ahumada  has a past medical history of Allergic rhinitis, Anxiety, Depression, Hypothyroid, and Irritable bowel syndrome. Past Surgical History  Alissa Ahumada  has a past surgical history that includes Endometrial ablation; Hysterectomy; Thyroid surgery; Wrist surgery; and other surgical history (Right, 08/09/2016).     Current Medications  Current Outpatient Medications   Medication Sig Dispense Refill    Multiple Vitamins-Minerals (THERAPEUTIC MULTIVITAMIN-MINERALS) tablet Take 1 tablet by mouth daily      gabapentin (NEURONTIN) 300 MG capsule TAKE 1 CAPSULE BY MOUTH THREE TIMES A DAY FOR 3 DAYS (Patient not taking: Reported on 8/20/2021)      diclofenac (VOLTAREN) 50 MG EC tablet Take 1 tablet by mouth 3 times daily as needed for Pain (Patient not taking: Reported on 12/27/2021) 60 tablet 3    diclofenac sodium (VOLTAREN) 1 % GEL Apply topically 2 times daily 2 grams per application (Patient not taking: Reported on 12/27/2021) 350 g 5    naproxen (EC NAPROSYN) 500 MG EC tablet Take 500 mg by mouth 2 times daily (with meals) (Patient not taking: Reported on 8/20/2021)      levothyroxine (SYNTHROID) 175 MCG tablet Take 1 tablet by mouth Daily 30 tablet 11    FLUoxetine (PROZAC) 40 MG capsule Take 40 mg by mouth daily (Patient not taking: Reported on 12/27/2021)      busPIRone (BUSPAR) 10 MG tablet Take 10 mg by mouth 3 times daily (Patient not taking: Reported on 12/27/2021)       No current facility-administered medications for this visit. Allergies  Allergies have been reviewed. Alena Reaves is allergic to percocet [oxycodone-acetaminophen]. Social History  Alena Reaves  reports that she has never smoked. She has never used smokeless tobacco. She reports current alcohol use. She reports that she does not use drugs. Family History  Brissa's family history includes Alcohol Abuse in her father; Asthma in her father; Depression in her mother; Diabetes in her mother; Heart Disease in her mother; High Blood Pressure in her brother, father, and mother; High Cholesterol in her father and mother. Physical Exam:     Ht 5' 4\" (1.626 m)   Wt 186 lb (84.4 kg)   BMI 31.93 kg/m²    Constitutional: Patient is oriented to person, place, and time. Patient appears well-developed and well nourished. Mental Status/psychiatric: Behavior is normal. Thought content normal.  HENT: Negative otherwise noted  Head: Normocephalic and Atraumatic  Nose: Normal  Respiratory/Cardio: Effort normal. No respiratory distress. Gait: normal    Shoulder:    Skin: Skin is warm and dry; no swelling or obvious muscular atrophy.    Vasculature: 2+ radial pulses bilaterally  Neuro: Sensation grossly intact to light touch diffusely  Tenderness: Non-tender to palpation    ROM: (Degrees)    Right   A P   Left   A P    Elevation  115 140   Elevation  145   Abduction  85 110   Abduction  150    ER   50 70   ER   65   IR   T12    IR   T10   90 abd/ER      90 abd/ER     90 abd/IR      90 abd/IR     Crepitation  No    Crepitation No  Dyskenesia  No    Dyskenesia No      Muscle strength:    Right       Left    Deltoid   5    Deltoid   5  Supraspinatus  5    Supraspinatus  5  ER   5    ER   5  IR   5    IR   5    Special tests    Right   Rotator Cuff    Left    y   Painful arc    n   n   Pain with ER    n    y   Neer's     n    y   Hawkin's    n    n   Drop Arm    n  n   Lift off/Belly Press   n  n   ER Lag    n          TRISTAR [facility] Joint  n   AC tenderness   n  y   Cross-chest adduction  n       Labrum/biceps    y (equivocal)  Bartholomew's    n   y   Biceps sheer    n      y   Speed's/Yergason's   n    y   Tenderness Biceps Groove  n    n   Andi's    n         Instability  n   Ant Apprehension   n    n   Post Apprehension   n    n   Ant Load shift    n    n   Post Load shift   n   n   Sulcus     n  n   Generalized Laxity   n  n   Relocation test   n  n   Crank test     n  n   Eliazar-superior escape  n       Imaging:  Xrays: 4 views of the right shoulder obtained on 1/17/2022 were independently reviewed  Indications: Right shoulder pain  Findings:  Normal glenohumeral and acromioclavicular joint spaces with a type 2 acromion. Impression: Normal appearing right shoulder radiographs      MRI: MRI of the right shoulder completed on 1/3/22 was reviewed independently demonstrating intact rotator cuff and biceps tendon. 2 lesions noted in the proximal metaphyseal diaphyseal region. No cortical involvement or obvious response by surrounding bone. Impression/Plan:     Marian Huynh is a 55 y.o. old female with a painful right shoulder. We had a discussion regarding possible etiologies. Given the bone lesions present I would recommend further evaluation by an orthopedic oncologist. A referral was made. To facilitate better control of her chronic pain given the fact that she's failed to respond to several anti-inflammatories, she was also referred to pain management.  I will have her follow up in my clinic as needed,

## 2022-02-07 NOTE — DISCHARGE SUMMARY
[] [facility]        Outpatient Physical                Therapy       [address]       Phone: [phone]       Fax: [phone] [x] [facility] at [address]       Phone: [phone]       Fax: [phone] [] [place] [facility]     [address] [facility]     Phone: [phone]     Fax:  [phone]     Physical Therapy Discharge Note    Date: 2022      Patient: Marco A Brunner  : 1975  MRN: [id number]    Physician: KAREN Urbina                       Insurance: [employer]   Medical Diagnosis: M54.2- neck pain; M25.511- acute pain of right shoulder                     Rehab Codes: M54.2; M25.511; M54.12; M62.81  Onset Date: 8/10/2021                       Next 's appt.: TBD- pending success with PT services   Visit# / total visits: 5/10          Cancels/No Shows: 2/3  Date of initial visit: 21            Date of final visit: 21       Discharge Status:     Pt with poor attendance to physical therapy services. Pt failed to make additional appointments for therapy. Pt. Is now discharged. Electronically signed by: Shantell Delarosa PT    If you have any questions or concerns, please don't hesitate to call.   Thank you for your referral.

## 2022-02-10 ENCOUNTER — TELEPHONE (OUTPATIENT)
Dept: FAMILY MEDICINE CLINIC | Age: 47
End: 2022-02-10

## 2022-02-10 NOTE — TELEPHONE ENCOUNTER
----- Message from Astrid Ríos sent at 2/10/2022  2:48 PM EST -----  Subject: Message to Provider    QUESTIONS  Information for Provider? Patient scheduled her thyroid followup for 02/22   and would like to have her lab order sent to [address] [place] [address] in   [address]. Please contact patient with any questions regarding this matter. ---------------------------------------------------------------------------  --------------  Danielito COLEMAN  What is the best way for the office to contact you? OK to leave message on   voicemail  Preferred Call Back Phone Number? [phone]  ---------------------------------------------------------------------------  --------------  SCRIPT ANSWERS  Relationship to Patient?  Self

## 2022-04-21 DIAGNOSIS — E03.9 HYPOTHYROIDISM, UNSPECIFIED TYPE: ICD-10-CM

## 2022-04-21 DIAGNOSIS — Z13.220 SCREENING CHOLESTEROL LEVEL: Primary | ICD-10-CM

## 2022-04-21 RX ORDER — LEVOTHYROXINE SODIUM 175 UG/1
TABLET ORAL
Qty: 30 TABLET | Refills: 0 | Status: SHIPPED | OUTPATIENT
Start: 2022-04-21 | End: 2022-06-14 | Stop reason: SDUPTHER

## 2022-04-21 NOTE — TELEPHONE ENCOUNTER
Yoseph Ames is calling to request a refill on the following medication(s):    Medication Request:  Requested Prescriptions     Pending Prescriptions Disp Refills    levothyroxine (SYNTHROID) 175 MCG tablet [Pharmacy Med Name: Levothyroxine Sodium Oral Tablet 175 MCG] 30 tablet 0     Sig: TAKE 1 TABLET BY MOUTH EVERY DAY       Last Visit Date (If Applicable):  9/52/6273 In office    Next Visit Date:    Visit date not found

## 2022-06-10 DIAGNOSIS — E03.9 HYPOTHYROIDISM, UNSPECIFIED TYPE: ICD-10-CM

## 2022-06-10 NOTE — TELEPHONE ENCOUNTER
Pam Chung is calling to request a refill on the following medication(s):    Medication Request:  Requested Prescriptions     Pending Prescriptions Disp Refills    levothyroxine (SYNTHROID) 175 MCG tablet [Pharmacy Med Name: Levothyroxine Sodium Oral Tablet 175 MCG] 30 tablet 0     Sig: TAKE 1 TABLET BY MOUTH EVERY DAY       Last Visit Date (If Applicable):  7/68/9365    Next Visit Date:    Visit date not found

## 2022-06-13 ENCOUNTER — HOSPITAL ENCOUNTER (OUTPATIENT)
Age: 47
Setting detail: SPECIMEN
Discharge: HOME | End: 2022-06-13

## 2022-06-13 DIAGNOSIS — Z13.220 SCREENING CHOLESTEROL LEVEL: ICD-10-CM

## 2022-06-13 DIAGNOSIS — E03.9 HYPOTHYROIDISM, UNSPECIFIED TYPE: ICD-10-CM

## 2022-06-13 LAB
ABSOLUTE EOS #: 0.05 K/UL (ref 0–0.44)
ABSOLUTE IMMATURE GRANULOCYTE: <0.03 K/UL (ref 0–0.3)
ABSOLUTE LYMPH #: 1.54 K/UL (ref 1.1–3.7)
ABSOLUTE MONO #: 0.3 K/UL (ref 0.1–1.2)
ALBUMIN SERPL-MCNC: 4.1 G/DL (ref 3.5–5.2)
ALBUMIN/GLOBULIN RATIO: 1.5 (ref 1–2.5)
ALP BLD-CCNC: 67 U/L (ref 35–104)
ALT SERPL-CCNC: 8 U/L (ref 5–33)
ANION GAP SERPL CALCULATED.3IONS-SCNC: 14 MMOL/L (ref 9–17)
AST SERPL-CCNC: 12 U/L
BASOPHILS # BLD: 1 % (ref 0–2)
BASOPHILS ABSOLUTE: 0.03 K/UL (ref 0–0.2)
BILIRUB SERPL-MCNC: 0.59 MG/DL (ref 0.3–1.2)
BUN BLDV-MCNC: 14 MG/DL (ref 6–20)
CALCIUM SERPL-MCNC: 9 MG/DL (ref 8.6–10.4)
CHLORIDE BLD-SCNC: 106 MMOL/L (ref 98–107)
CHOLESTEROL, FASTING: 185 MG/DL
CHOLESTEROL/HDL RATIO: 4.9
CO2: 19 MMOL/L (ref 20–31)
CREAT SERPL-MCNC: 0.71 MG/DL (ref 0.5–0.9)
EOSINOPHILS RELATIVE PERCENT: 1 % (ref 1–4)
GFR AFRICAN AMERICAN: >60 ML/MIN
GFR NON-AFRICAN AMERICAN: >60 ML/MIN
GFR SERPL CREATININE-BSD FRML MDRD: ABNORMAL ML/MIN/{1.73_M2}
GLUCOSE BLD-MCNC: 96 MG/DL (ref 70–99)
HCT VFR BLD CALC: 41.8 % (ref 36.3–47.1)
HDLC SERPL-MCNC: 38 MG/DL
HEMOGLOBIN: 13.5 G/DL (ref 11.9–15.1)
IMMATURE GRANULOCYTES: 0 %
LDL CHOLESTEROL: 125 MG/DL (ref 0–130)
LYMPHOCYTES # BLD: 28 % (ref 24–43)
MCH RBC QN AUTO: 29.3 PG (ref 25.2–33.5)
MCHC RBC AUTO-ENTMCNC: 32.3 G/DL (ref 28.4–34.8)
MCV RBC AUTO: 90.9 FL (ref 82.6–102.9)
MONOCYTES # BLD: 5 % (ref 3–12)
NRBC AUTOMATED: 0 PER 100 WBC
PDW BLD-RTO: 12.4 % (ref 11.8–14.4)
PLATELET # BLD: 186 K/UL (ref 138–453)
PMV BLD AUTO: 9.8 FL (ref 8.1–13.5)
POTASSIUM SERPL-SCNC: 4.4 MMOL/L (ref 3.7–5.3)
RBC # BLD: 4.6 M/UL (ref 3.95–5.11)
SEG NEUTROPHILS: 65 % (ref 36–65)
SEGMENTED NEUTROPHILS ABSOLUTE COUNT: 3.57 K/UL (ref 1.5–8.1)
SODIUM BLD-SCNC: 139 MMOL/L (ref 135–144)
THYROXINE, FREE: 1.38 NG/DL (ref 0.93–1.7)
TOTAL PROTEIN: 6.9 G/DL (ref 6.4–8.3)
TRIGLYCERIDE, FASTING: 109 MG/DL
TSH SERPL DL<=0.05 MIU/L-ACNC: 21.79 UIU/ML (ref 0.3–5)
WBC # BLD: 5.5 K/UL (ref 3.5–11.3)

## 2022-06-14 ENCOUNTER — TELEPHONE (OUTPATIENT)
Dept: FAMILY MEDICINE CLINIC | Age: 47
End: 2022-06-14

## 2022-06-14 DIAGNOSIS — E03.9 HYPOTHYROIDISM, UNSPECIFIED TYPE: ICD-10-CM

## 2022-06-14 RX ORDER — LEVOTHYROXINE SODIUM 0.2 MG/1
200 TABLET ORAL DAILY
Qty: 45 TABLET | Refills: 0 | Status: SHIPPED | OUTPATIENT
Start: 2022-06-14 | End: 2022-08-09 | Stop reason: SDUPTHER

## 2022-06-14 RX ORDER — LEVOTHYROXINE SODIUM 175 UG/1
TABLET ORAL
Qty: 30 TABLET | Refills: 0 | OUTPATIENT
Start: 2022-06-14

## 2022-06-14 NOTE — TELEPHONE ENCOUNTER
Cris,    Patient had her blood work (in [address]) done needs her Levothyroxine called in she is leaving on vacation tomorrow.     Thank you,  Finn Egan

## 2022-06-14 NOTE — TELEPHONE ENCOUNTER
Please advise that I have increased her dose to 200mcg daily and she will need to repeat levels in 6 weeks.

## 2022-08-04 ENCOUNTER — HOSPITAL ENCOUNTER (OUTPATIENT)
Age: 47
Discharge: HOME | End: 2022-08-04
Payer: COMMERCIAL

## 2022-08-04 DIAGNOSIS — E03.9 HYPOTHYROIDISM, UNSPECIFIED TYPE: ICD-10-CM

## 2022-08-04 LAB
THYROXINE, FREE: 1.82 NG/DL (ref 0.93–1.7)
TSH SERPL DL<=0.05 MIU/L-ACNC: 0.21 UIU/ML (ref 0.3–5)

## 2022-08-04 PROCEDURE — 36415 COLL VENOUS BLD VENIPUNCTURE: CPT

## 2022-08-04 PROCEDURE — 84443 ASSAY THYROID STIM HORMONE: CPT

## 2022-08-04 PROCEDURE — 84439 ASSAY OF FREE THYROXINE: CPT

## 2022-08-09 ENCOUNTER — TELEPHONE (OUTPATIENT)
Dept: FAMILY MEDICINE CLINIC | Age: 47
End: 2022-08-09

## 2022-08-09 DIAGNOSIS — E03.9 HYPOTHYROIDISM, UNSPECIFIED TYPE: ICD-10-CM

## 2022-08-09 RX ORDER — LEVOTHYROXINE SODIUM 175 UG/1
175 TABLET ORAL DAILY
Qty: 45 TABLET | Refills: 0 | Status: SHIPPED | OUTPATIENT
Start: 2022-08-09

## 2022-08-09 NOTE — TELEPHONE ENCOUNTER
Patient called and was asking for lab results. Writer seen they was completed just needs reviewed from PCP. Pt also stated that she is almost out of medication and depending on lab results the dosage may change.  Thank you

## 2022-08-09 NOTE — TELEPHONE ENCOUNTER
TSH has dropped and free T4 is slightly high. Will decrease the dose and repeat labs in 6 weeks. Make sure to take on empty stomach at the same time every day.

## 2022-08-23 ENCOUNTER — TELEPHONE (OUTPATIENT)
Dept: FAMILY MEDICINE CLINIC | Age: 47
End: 2022-08-23

## 2022-08-23 NOTE — TELEPHONE ENCOUNTER
Patient called stating while walking her dog last night a fly flew in her ear and she feels like it is still in there. Patient asked to be seen today after 4 pm when she gets off work. There are no openings at that time. Today. I did advise patient to go to urgent care and to follow up with the office. Patient stated that she will go to urgent care.

## 2022-12-16 ENCOUNTER — TELEPHONE (OUTPATIENT)
Dept: FAMILY MEDICINE CLINIC | Age: 47
End: 2022-12-16

## 2022-12-16 NOTE — TELEPHONE ENCOUNTER
----- Message from Netechy Speaker sent at 12/15/2022 11:21 AM EST -----  Subject: Message to Provider    QUESTIONS  Information for Provider? Please call pt to schedule new to provider appt.     ---------------------------------------------------------------------------  --------------  [address] [facility]  [phone]; OK to leave message on voicemail  ---------------------------------------------------------------------------  --------------  SCRIPT ANSWERS  Relationship to Patient?  Self

## 2022-12-27 DIAGNOSIS — E03.9 HYPOTHYROIDISM, UNSPECIFIED TYPE: Primary | ICD-10-CM

## 2022-12-27 DIAGNOSIS — E03.9 HYPOTHYROIDISM, UNSPECIFIED TYPE: ICD-10-CM

## 2022-12-27 RX ORDER — LEVOTHYROXINE SODIUM 175 UG/1
175 TABLET ORAL DAILY
Qty: 45 TABLET | Refills: 0 | Status: SHIPPED | OUTPATIENT
Start: 2022-12-27

## 2022-12-27 NOTE — TELEPHONE ENCOUNTER
Domenico Gomes is calling to request a refill on the following medication(s):    Medication Request:  Requested Prescriptions     Pending Prescriptions Disp Refills    levothyroxine (SYNTHROID) 175 MCG tablet 45 tablet 0     Sig: Take 1 tablet by mouth Daily       Last Visit Date (If Applicable):  8/63/6803    Next Visit Date:    Visit date not found

## 2022-12-27 NOTE — TELEPHONE ENCOUNTER
Pt needs to have thyroid labs rechecked, these were ordered by Ramon Mauro in August because the last panel was abnormal. I'll place them.

## 2023-01-10 ENCOUNTER — TELEPHONE (OUTPATIENT)
Dept: FAMILY MEDICINE CLINIC | Age: 48
End: 2023-01-10

## 2023-01-10 NOTE — TELEPHONE ENCOUNTER
----- Message from Kristel Rodriguez sent at 12/27/2022 11:09 AM EST -----  Subject: Refill Request    QUESTIONS  Name of Medication? levothyroxine (SYNTHROID) 175 MCG tablet  Patient-reported dosage and instructions? Take 1 tablet by mouth in the   morning  How many days do you have left? 0  Preferred Pharmacy? [id number]  Pharmacy phone number (if available)? [phone]  Additional Information for Provider? Patient stated she's been out of   medication for week and a half, unable to wait until first visit   1/18/2022, would like if clinical staff could call her back   ---------------------------------------------------------------------------  --------------  CALL BACK INFO  What is the best way for the office to contact you? OK to leave message on   voicemail  Preferred Call Back Phone Number? [phone]  ---------------------------------------------------------------------------  --------------  SCRIPT ANSWERS  Relationship to Patient? Self

## 2023-01-10 NOTE — TELEPHONE ENCOUNTER
----- Message from Jimmyjustina Manav sent at 12/27/2022 11:09 AM EST -----  Subject: Refill Request    QUESTIONS  Name of Medication? levothyroxine (SYNTHROID) 175 MCG tablet  Patient-reported dosage and instructions? Take 1 tablet by mouth in the   morning  How many days do you have left? 0  Preferred Pharmacy? [address]  Pharmacy phone number (if available)? [phone]  Additional Information for Provider? Patient stated she's been out of   medication for week and a half, unable to wait until first visit   1/18/2022, would like if clinical staff could call her back   ---------------------------------------------------------------------------  --------------  [address]  What is the best way for the office to contact you? OK to leave message on   voicemail  Preferred Call Back Phone Number? [phone]  ---------------------------------------------------------------------------  --------------  SCRIPT ANSWERS  Relationship to Patient?  Self

## 2023-01-17 ENCOUNTER — HOSPITAL ENCOUNTER (OUTPATIENT)
Facility: CLINIC | Age: 48
Discharge: HOME | End: 2023-01-17
Payer: COMMERCIAL

## 2023-01-17 DIAGNOSIS — E03.9 HYPOTHYROIDISM, UNSPECIFIED TYPE: ICD-10-CM

## 2023-01-17 PROCEDURE — 36415 COLL VENOUS BLD VENIPUNCTURE: CPT

## 2023-01-17 PROCEDURE — 84443 ASSAY THYROID STIM HORMONE: CPT

## 2023-01-17 PROCEDURE — 84439 ASSAY OF FREE THYROXINE: CPT

## 2023-01-18 ENCOUNTER — OFFICE VISIT (OUTPATIENT)
Dept: FAMILY MEDICINE CLINIC | Age: 48
End: 2023-01-18
Payer: COMMERCIAL

## 2023-01-18 VITALS
DIASTOLIC BLOOD PRESSURE: 82 MMHG | WEIGHT: 185.6 LBS | HEART RATE: 88 BPM | SYSTOLIC BLOOD PRESSURE: 112 MMHG | OXYGEN SATURATION: 98 % | BODY MASS INDEX: 31.86 KG/M2 | TEMPERATURE: 97.2 F

## 2023-01-18 DIAGNOSIS — E03.9 HYPOTHYROIDISM, UNSPECIFIED TYPE: ICD-10-CM

## 2023-01-18 DIAGNOSIS — F41.1 GENERALIZED ANXIETY DISORDER: ICD-10-CM

## 2023-01-18 DIAGNOSIS — Z13.220 SCREENING CHOLESTEROL LEVEL: ICD-10-CM

## 2023-01-18 DIAGNOSIS — Z13.1 SCREENING FOR DIABETES MELLITUS: ICD-10-CM

## 2023-01-18 DIAGNOSIS — F33.2 SEVERE RECURRENT MAJOR DEPRESSION WITHOUT PSYCHOTIC FEATURES (HCC): ICD-10-CM

## 2023-01-18 DIAGNOSIS — Z76.89 ENCOUNTER TO ESTABLISH CARE: Primary | ICD-10-CM

## 2023-01-18 LAB
THYROXINE, FREE: 1.54 NG/DL (ref 0.93–1.7)
TSH SERPL DL<=0.05 MIU/L-ACNC: 0.74 UIU/ML (ref 0.3–5)

## 2023-01-18 PROCEDURE — 99213 OFFICE O/P EST LOW 20 MIN: CPT

## 2023-01-18 RX ORDER — SERTRALINE HYDROCHLORIDE 100 MG/1
100 TABLET, FILM COATED ORAL DAILY
COMMUNITY
Start: 2022-11-02 | End: 2023-11-02

## 2023-01-18 SDOH — ECONOMIC STABILITY: FOOD INSECURITY: WITHIN THE PAST 12 MONTHS, THE FOOD YOU BOUGHT JUST DIDN'T LAST AND YOU DIDN'T HAVE MONEY TO GET MORE.: NEVER TRUE

## 2023-01-18 SDOH — ECONOMIC STABILITY: TRANSPORTATION INSECURITY
IN THE PAST 12 MONTHS, HAS LACK OF TRANSPORTATION KEPT YOU FROM MEETINGS, WORK, OR FROM GETTING THINGS NEEDED FOR DAILY LIVING?: NO

## 2023-01-18 SDOH — ECONOMIC STABILITY: TRANSPORTATION INSECURITY
IN THE PAST 12 MONTHS, HAS THE LACK OF TRANSPORTATION KEPT YOU FROM MEDICAL APPOINTMENTS OR FROM GETTING MEDICATIONS?: NO

## 2023-01-18 SDOH — ECONOMIC STABILITY: FOOD INSECURITY: WITHIN THE PAST 12 MONTHS, YOU WORRIED THAT YOUR FOOD WOULD RUN OUT BEFORE YOU GOT MONEY TO BUY MORE.: NEVER TRUE

## 2023-01-18 SDOH — HEALTH STABILITY: PHYSICAL HEALTH: ON AVERAGE, HOW MANY DAYS PER WEEK DO YOU ENGAGE IN MODERATE TO STRENUOUS EXERCISE (LIKE A BRISK WALK)?: 0 DAYS

## 2023-01-18 ASSESSMENT — PATIENT HEALTH QUESTIONNAIRE - PHQ9
SUM OF ALL RESPONSES TO PHQ9 QUESTIONS 1 & 2: 5
SUM OF ALL RESPONSES TO PHQ QUESTIONS 1-9: 16
2. FEELING DOWN, DEPRESSED OR HOPELESS: 3
1. LITTLE INTEREST OR PLEASURE IN DOING THINGS: 2
SUM OF ALL RESPONSES TO PHQ QUESTIONS 1-9: 16
9. THOUGHTS THAT YOU WOULD BE BETTER OFF DEAD, OR OF HURTING YOURSELF: 0
5. POOR APPETITE OR OVEREATING: 3
7. TROUBLE CONCENTRATING ON THINGS, SUCH AS READING THE NEWSPAPER OR WATCHING TELEVISION: 3
6. FEELING BAD ABOUT YOURSELF - OR THAT YOU ARE A FAILURE OR HAVE LET YOURSELF OR YOUR FAMILY DOWN: 2
3. TROUBLE FALLING OR STAYING ASLEEP: 0
10. IF YOU CHECKED OFF ANY PROBLEMS, HOW DIFFICULT HAVE THESE PROBLEMS MADE IT FOR YOU TO DO YOUR WORK, TAKE CARE OF THINGS AT HOME, OR GET ALONG WITH OTHER PEOPLE: 1
SUM OF ALL RESPONSES TO PHQ QUESTIONS 1-9: 16
SUM OF ALL RESPONSES TO PHQ QUESTIONS 1-9: 16
4. FEELING TIRED OR HAVING LITTLE ENERGY: 3
8. MOVING OR SPEAKING SO SLOWLY THAT OTHER PEOPLE COULD HAVE NOTICED. OR THE OPPOSITE, BEING SO FIGETY OR RESTLESS THAT YOU HAVE BEEN MOVING AROUND A LOT MORE THAN USUAL: 0

## 2023-01-18 ASSESSMENT — ENCOUNTER SYMPTOMS
COUGH: 0
SORE THROAT: 0
SHORTNESS OF BREATH: 0
WHEEZING: 0
NAUSEA: 0
COLOR CHANGE: 0
ABDOMINAL PAIN: 0
EYE DISCHARGE: 0
DIARRHEA: 0
CONSTIPATION: 0
VOMITING: 0

## 2023-01-18 ASSESSMENT — SOCIAL DETERMINANTS OF HEALTH (SDOH)
WITHIN THE LAST YEAR, HAVE YOU BEEN AFRAID OF YOUR PARTNER OR EX-PARTNER?: NO
WITHIN THE LAST YEAR, HAVE TO BEEN RAPED OR FORCED TO HAVE ANY KIND OF SEXUAL ACTIVITY BY YOUR PARTNER OR EX-PARTNER?: NO
WITHIN THE LAST YEAR, HAVE YOU BEEN KICKED, HIT, SLAPPED, OR OTHERWISE PHYSICALLY HURT BY YOUR PARTNER OR EX-PARTNER?: NO
WITHIN THE LAST YEAR, HAVE YOU BEEN HUMILIATED OR EMOTIONALLY ABUSED IN OTHER WAYS BY YOUR PARTNER OR EX-PARTNER?: NO
HOW HARD IS IT FOR YOU TO PAY FOR THE VERY BASICS LIKE FOOD, HOUSING, MEDICAL CARE, AND HEATING?: NOT HARD AT ALL

## 2023-01-18 NOTE — PROGRESS NOTES
Bia Calles (:  1975) is a 52 y.o. female,Established patient, here for evaluation of the following chief complaint(s):  Establish Care, Thyroid Problem, Memory Loss (Foggy brain and issues with memory ), and Leg Pain (Leg below knee throbbing, restless)          Subjective   SUBJECTIVE/OBJECTIVE:  Pt here today to establish care  VS and weight stable    PMH significant for hypothyroid, pt has been compliant w/ current dose. Last labs in 2022 show abnormalities, pt completed labs yesterday prior to visit but results are still pending. Pt denies acute sx, had previous gastroparesis that she followed w/ GI for but sx have generally improved per pt. Depression screenings positive, pt follows w/ psychiatry and is doing well on her current medication. Pt denies SI/HI, anxiety sx generally well controlled. Pt does endorse some brain fog and memory issues, she has had this for a while and cannot definitively identify if it is worsening or improving. We discussed the correlation of her thyroid to this at times, will await most recent labs before initiating further eval.     Willing to update screening labs. Review of Systems   Constitutional:  Negative for activity change, fatigue, fever and unexpected weight change. HENT:  Negative for congestion, ear pain and sore throat. Eyes:  Negative for discharge and visual disturbance. Respiratory:  Negative for cough, shortness of breath and wheezing. Cardiovascular:  Negative for chest pain, palpitations and leg swelling. Gastrointestinal:  Negative for abdominal pain, constipation, diarrhea, nausea and vomiting. Endocrine: Negative for cold intolerance and heat intolerance. Genitourinary:  Negative for dysuria and pelvic pain. Musculoskeletal:  Negative for gait problem and neck pain. Skin:  Negative for color change, rash and wound.    Neurological:  Negative for dizziness, seizures, syncope, weakness, light-headedness, numbness and headaches. Psychiatric/Behavioral:  Positive for decreased concentration. Negative for behavioral problems, confusion, sleep disturbance and suicidal ideas. The patient is not nervous/anxious. Objective   Physical Exam  Vitals and nursing note reviewed. Constitutional:       General: She is not in acute distress. Appearance: Normal appearance. She is well-developed. She is obese. She is not ill-appearing, toxic-appearing or diaphoretic. HENT:      Head: Normocephalic and atraumatic. Right Ear: External ear normal.      Left Ear: External ear normal.      Nose: Nose normal.   Eyes:      General: No scleral icterus. Right eye: No discharge. Left eye: No discharge. Conjunctiva/sclera: Conjunctivae normal.      Pupils: Pupils are equal, round, and reactive to light. Neck:      Vascular: No carotid bruit. Trachea: No tracheal deviation. Cardiovascular:      Rate and Rhythm: Normal rate and regular rhythm. Heart sounds: Normal heart sounds. No murmur heard. No friction rub. No gallop. Pulmonary:      Effort: Pulmonary effort is normal. No tachypnea, accessory muscle usage or respiratory distress. Breath sounds: Normal breath sounds. No stridor. No decreased breath sounds, wheezing, rhonchi or rales. Abdominal:      Palpations: Abdomen is soft. Musculoskeletal:         General: No tenderness or deformity. Normal range of motion. Cervical back: Normal range of motion and neck supple. Right lower leg: No edema. Left lower leg: No edema. Skin:     General: Skin is warm and dry. Coloration: Skin is not pale. Findings: No erythema or rash. Neurological:      Mental Status: She is alert and oriented to person, place, and time. GCS: GCS eye subscore is 4. GCS verbal subscore is 5. GCS motor subscore is 6. Gait: Gait is intact.  Gait normal.   Psychiatric:         Speech: Speech normal.         Behavior: Behavior normal.         Thought Content: Thought content normal.         Judgment: Judgment normal.               ASSESSMENT/PLAN:  1. Encounter to establish care    2. Hypothyroidism, unspecified type  Assessment & Plan:   Unclear control, continue current medications pending labs     Orders:  -     CBC with Auto Differential; Future  -     Comprehensive Metabolic Panel; Future    3. Severe recurrent major depression without psychotic features [facility])  Assessment & Plan:   Monitored by specialist- no acute findings meriting change in the plan    4. Generalized anxiety disorder  Assessment & Plan:   Monitored by specialist- no acute findings meriting change in the plan    5. Screening cholesterol level  -     Lipid, Fasting; Future    6. Screening for diabetes mellitus  -     Hemoglobin A1C; Future    Return in about 3 months (around 4/18/2023), or if symptoms worsen or fail to improve, for thyroid. An electronic signature was used to authenticate this note.     --YUNIOR Ambrose - CNP

## 2023-02-11 ENCOUNTER — HOSPITAL ENCOUNTER (OUTPATIENT)
Age: 48
Discharge: HOME | End: 2023-02-11
Payer: COMMERCIAL

## 2023-02-11 DIAGNOSIS — Z13.220 SCREENING CHOLESTEROL LEVEL: ICD-10-CM

## 2023-02-11 DIAGNOSIS — Z13.1 SCREENING FOR DIABETES MELLITUS: ICD-10-CM

## 2023-02-11 DIAGNOSIS — E03.9 HYPOTHYROIDISM, UNSPECIFIED TYPE: ICD-10-CM

## 2023-02-11 LAB
ABSOLUTE EOS #: 0.06 K/UL (ref 0–0.44)
ABSOLUTE IMMATURE GRANULOCYTE: <0.03 K/UL (ref 0–0.3)
ABSOLUTE LYMPH #: 1.35 K/UL (ref 1.1–3.7)
ABSOLUTE MONO #: 0.33 K/UL (ref 0.1–1.2)
ALBUMIN SERPL-MCNC: 3.9 G/DL (ref 3.5–5.2)
ALBUMIN/GLOBULIN RATIO: 1.3 (ref 1–2.5)
ALP SERPL-CCNC: 76 U/L (ref 35–104)
ALT SERPL-CCNC: 12 U/L (ref 5–33)
ANION GAP SERPL CALCULATED.3IONS-SCNC: 8 MMOL/L (ref 9–17)
AST SERPL-CCNC: 13 U/L
BASOPHILS # BLD: 0 % (ref 0–2)
BASOPHILS ABSOLUTE: <0.03 K/UL (ref 0–0.2)
BILIRUB SERPL-MCNC: 0.3 MG/DL (ref 0.3–1.2)
BUN SERPL-MCNC: 16 MG/DL (ref 6–20)
CALCIUM SERPL-MCNC: 8.8 MG/DL (ref 8.6–10.4)
CHLORIDE SERPL-SCNC: 106 MMOL/L (ref 98–107)
CHOLEST SERPL-MCNC: 178 MG/DL
CHOLESTEROL/HDL RATIO: 4.5
CO2 SERPL-SCNC: 23 MMOL/L (ref 20–31)
CREAT SERPL-MCNC: 0.71 MG/DL (ref 0.5–0.9)
EOSINOPHILS RELATIVE PERCENT: 1 % (ref 1–4)
GFR SERPL CREATININE-BSD FRML MDRD: >60 ML/MIN/1.73M2
GLUCOSE SERPL-MCNC: 89 MG/DL (ref 70–99)
HCT VFR BLD AUTO: 38.9 % (ref 36.3–47.1)
HDLC SERPL-MCNC: 40 MG/DL
HGB BLD-MCNC: 12.9 G/DL (ref 11.9–15.1)
IMMATURE GRANULOCYTES: 0 %
LDLC SERPL CALC-MCNC: 117 MG/DL (ref 0–130)
LYMPHOCYTES # BLD: 27 % (ref 24–43)
MCH RBC QN AUTO: 29.8 PG (ref 25.2–33.5)
MCHC RBC AUTO-ENTMCNC: 33.2 G/DL (ref 28.4–34.8)
MCV RBC AUTO: 89.8 FL (ref 82.6–102.9)
MONOCYTES # BLD: 7 % (ref 3–12)
NRBC AUTOMATED: 0 PER 100 WBC
PDW BLD-RTO: 13.3 % (ref 11.8–14.4)
PLATELET # BLD AUTO: 210 K/UL (ref 138–453)
PMV BLD AUTO: 9.5 FL (ref 8.1–13.5)
POTASSIUM SERPL-SCNC: 4.4 MMOL/L (ref 3.7–5.3)
PROT SERPL-MCNC: 6.8 G/DL (ref 6.4–8.3)
RBC # BLD: 4.33 M/UL (ref 3.95–5.11)
SEG NEUTROPHILS: 65 % (ref 36–65)
SEGMENTED NEUTROPHILS ABSOLUTE COUNT: 3.27 K/UL (ref 1.5–8.1)
SODIUM SERPL-SCNC: 137 MMOL/L (ref 135–144)
TRIGL SERPL-MCNC: 104 MG/DL
WBC # BLD AUTO: 5.1 K/UL (ref 3.5–11.3)

## 2023-02-11 PROCEDURE — 80061 LIPID PANEL: CPT

## 2023-02-11 PROCEDURE — 36415 COLL VENOUS BLD VENIPUNCTURE: CPT

## 2023-02-11 PROCEDURE — 83036 HEMOGLOBIN GLYCOSYLATED A1C: CPT

## 2023-02-11 PROCEDURE — 80053 COMPREHEN METABOLIC PANEL: CPT

## 2023-02-11 PROCEDURE — 85025 COMPLETE CBC W/AUTO DIFF WBC: CPT

## 2023-02-12 LAB
EST. AVERAGE GLUCOSE BLD GHB EST-MCNC: 97 MG/DL
HBA1C MFR BLD: 5 % (ref 4–6)

## 2023-02-15 DIAGNOSIS — E03.9 HYPOTHYROIDISM, UNSPECIFIED TYPE: ICD-10-CM

## 2023-02-16 RX ORDER — LEVOTHYROXINE SODIUM 175 UG/1
TABLET ORAL
Qty: 90 TABLET | Refills: 0 | Status: SHIPPED | OUTPATIENT
Start: 2023-02-16

## 2023-02-16 NOTE — TELEPHONE ENCOUNTER
Lupillo Clifford is calling to request a refill on the following medication(s):    Medication Request:  Requested Prescriptions     Pending Prescriptions Disp Refills    levothyroxine (SYNTHROID) 175 MCG tablet [Pharmacy Med Name: Levothyroxine Sodium Oral Tablet 175 MCG] 90 tablet 0     Sig: TAKE 1 TABLET BY MOUTH EVERY DAY       Last Visit Date (If Applicable):  6/69/2385    Next Visit Date:    4/18/2023

## 2023-04-18 ENCOUNTER — OFFICE VISIT (OUTPATIENT)
Dept: FAMILY MEDICINE CLINIC | Age: 48
End: 2023-04-18
Payer: COMMERCIAL

## 2023-04-18 VITALS
BODY MASS INDEX: 32.61 KG/M2 | TEMPERATURE: 97.1 F | WEIGHT: 190 LBS | OXYGEN SATURATION: 96 % | DIASTOLIC BLOOD PRESSURE: 82 MMHG | HEART RATE: 78 BPM | SYSTOLIC BLOOD PRESSURE: 120 MMHG

## 2023-04-18 DIAGNOSIS — M54.2 CHRONIC NECK PAIN: Primary | ICD-10-CM

## 2023-04-18 DIAGNOSIS — G89.29 CHRONIC NECK PAIN: Primary | ICD-10-CM

## 2023-04-18 DIAGNOSIS — M25.511 CHRONIC RIGHT SHOULDER PAIN: ICD-10-CM

## 2023-04-18 DIAGNOSIS — G89.29 CHRONIC RIGHT SHOULDER PAIN: ICD-10-CM

## 2023-04-18 DIAGNOSIS — M54.12 CERVICAL RADICULOPATHY: ICD-10-CM

## 2023-04-18 PROCEDURE — 99214 OFFICE O/P EST MOD 30 MIN: CPT

## 2023-04-18 RX ORDER — METHYLPREDNISOLONE 4 MG/1
TABLET ORAL
Qty: 1 KIT | Refills: 0 | Status: SHIPPED | OUTPATIENT
Start: 2023-04-18 | End: 2023-04-24

## 2023-04-18 RX ORDER — TIZANIDINE 4 MG/1
4 TABLET ORAL EVERY 8 HOURS PRN
Qty: 30 TABLET | Refills: 0 | Status: SHIPPED | OUTPATIENT
Start: 2023-04-18

## 2023-04-18 SDOH — ECONOMIC STABILITY: HOUSING INSECURITY
IN THE LAST 12 MONTHS, WAS THERE A TIME WHEN YOU DID NOT HAVE A STEADY PLACE TO SLEEP OR SLEPT IN A SHELTER (INCLUDING NOW)?: NO

## 2023-04-18 SDOH — ECONOMIC STABILITY: INCOME INSECURITY: HOW HARD IS IT FOR YOU TO PAY FOR THE VERY BASICS LIKE FOOD, HOUSING, MEDICAL CARE, AND HEATING?: NOT HARD AT ALL

## 2023-04-18 SDOH — ECONOMIC STABILITY: FOOD INSECURITY: WITHIN THE PAST 12 MONTHS, THE FOOD YOU BOUGHT JUST DIDN'T LAST AND YOU DIDN'T HAVE MONEY TO GET MORE.: NEVER TRUE

## 2023-04-18 SDOH — ECONOMIC STABILITY: FOOD INSECURITY: WITHIN THE PAST 12 MONTHS, YOU WORRIED THAT YOUR FOOD WOULD RUN OUT BEFORE YOU GOT MONEY TO BUY MORE.: NEVER TRUE

## 2023-04-18 ASSESSMENT — ENCOUNTER SYMPTOMS: COLOR CHANGE: 0

## 2023-04-18 NOTE — PROGRESS NOTES
Cynthia Patient (:  1975) is a 52 y.o. female,Established patient, here for evaluation of the following chief complaint(s):  Follow-up and Neck Pain (Pain from neck to fingers)          Subjective   SUBJECTIVE/OBJECTIVE:  Pt here today for routine f/u  VSS    Pt has acute on chronic c/o worsening right sided shoulder/neck pain w/ radiculopathy  She denies new injury, she was told she needed a CT scan of her shoulder per ortho and insurance denied this    She had a NM bone scan done d/t concerns for possible metastatic disease on MRI right shoulder two years ago and bone scan done through [facility] shows reuptake on the noted lesions to her right humerus however this was identified as degenerative      Review of Systems   Constitutional:  Negative for chills and fever. Musculoskeletal:  Positive for arthralgias, myalgias and neck pain. Skin:  Negative for color change, rash and wound. Objective   Physical Exam  Vitals and nursing note reviewed. Constitutional:       General: She is not in acute distress. Appearance: Normal appearance. She is not ill-appearing, toxic-appearing or diaphoretic. Musculoskeletal:      Right shoulder: Tenderness present. No swelling. Decreased range of motion. Left shoulder: Normal.      Cervical back: Spasms and tenderness present. No bony tenderness. Pain with movement present. Decreased range of motion. Neurological:      Mental Status: She is alert. ASSESSMENT/PLAN:  1. Chronic neck pain  2. Chronic right shoulder pain  3. Cervical radiculopathy  -medrol dose pack and PRN zanaflex for acute sx  -encouraged pt to call ortho and get back in for re-eval, peer to peer may be needed in order to get further imaging    -     methylPREDNISolone (MEDROL, LOLI,) 4 MG tablet; Take as directed, Disp-1 kit, R-0Normal  -     tiZANidine (ZANAFLEX) 4 MG tablet;  Take 1 tablet by mouth every 8 hours as needed (muscle spasm), Disp-30 tablet, None

## 2023-05-04 ENCOUNTER — TELEPHONE (OUTPATIENT)
Dept: FAMILY MEDICINE CLINIC | Age: 48
End: 2023-05-04

## 2023-05-04 ENCOUNTER — OFFICE VISIT (OUTPATIENT)
Dept: ORTHOPEDIC SURGERY | Age: 48
End: 2023-05-04
Payer: COMMERCIAL

## 2023-05-04 VITALS — WEIGHT: 190 LBS | HEIGHT: 64 IN | RESPIRATION RATE: 14 BRPM | BODY MASS INDEX: 32.44 KG/M2

## 2023-05-04 DIAGNOSIS — M25.511 RIGHT SHOULDER PAIN, UNSPECIFIED CHRONICITY: Primary | ICD-10-CM

## 2023-05-04 PROCEDURE — 99212 OFFICE O/P EST SF 10 MIN: CPT | Performed by: ORTHOPAEDIC SURGERY

## 2023-05-04 NOTE — TELEPHONE ENCOUNTER
Patient called today regarding the lesions and pain she has. Patient was sent today by ortho today and they suggested that she see her oncologist Dr. Jewel Melgar but she can't get in until the end of the month.  Patient is at wits end and is asking if there is anything you can do to assist in either helping to get into Dr Alejandro Chopra office sooner or if she should be referred to another oncologist.     Please advise

## 2023-05-04 NOTE — PROGRESS NOTES
HPI: Ms. Sivakumar Limon is a 70-year-old here today for evaluation of her right shoulder. Have seen her in the past for this issue. At that time she had an MRI demonstrated some lesions in the proximal humerus and so she was referred to Dr. Celestina Goldberg for further evaluation and treatment. She states that he had recommended a CT scan of her shoulder but this was not authorized by her insurance. She indicates that Dr. Farley Bernheim office made an attempt with the insurance company to try and get it authorized unsuccessfully and she states that she even called the insurance company herself and it still was not authorized. In the interim she has had persistent pain now involving the entire arm extending from her neck down. And so she was once again sent back to my clinic by her PCP for evaluation. I explained to the patient today that ultimately I would defer to Dr. Farley Bernheim judgment with regards to management or and/or work-up for the bone lesions in her shoulder and would not have much else to offer at this time as it pertains to that. However the description of her pain at this time as extending from her neck all the way down her arm would appear to be cervical in nature. I did review the MRI of her cervical spine from about a year ago. I do not see anything significant on the images after independent review. Nonetheless I recommended that the patient possible evaluation by spine surgeon. At this time the patient expressed an intent to contact and follow-up with Dr. Zachary Walker. I will see her back in my clinic as needed. She may return or call at anytime with questions or concerns.

## 2023-08-20 DIAGNOSIS — E03.9 HYPOTHYROIDISM, UNSPECIFIED TYPE: ICD-10-CM

## 2023-08-21 RX ORDER — LEVOTHYROXINE SODIUM 175 UG/1
TABLET ORAL
Qty: 90 TABLET | Refills: 0 | Status: SHIPPED | OUTPATIENT
Start: 2023-08-21

## 2023-08-21 NOTE — TELEPHONE ENCOUNTER
Romel Rosales is calling to request a refill on the following medication(s):    Medication Request:  Requested Prescriptions     Pending Prescriptions Disp Refills    levothyroxine (SYNTHROID) 175 MCG tablet [Pharmacy Med Name: Levothyroxine Sodium Oral Tablet 175 MCG] 90 tablet 0     Sig: TAKE 1 TABLET BY MOUTH EVERY DAY       Last Visit Date (If Applicable):  3/89/8348    Next Visit Date:    10/17/2023

## 2023-10-17 ENCOUNTER — OFFICE VISIT (OUTPATIENT)
Dept: FAMILY MEDICINE CLINIC | Age: 48
End: 2023-10-17
Payer: COMMERCIAL

## 2023-10-17 VITALS
TEMPERATURE: 97.1 F | SYSTOLIC BLOOD PRESSURE: 108 MMHG | DIASTOLIC BLOOD PRESSURE: 82 MMHG | OXYGEN SATURATION: 99 % | BODY MASS INDEX: 31.58 KG/M2 | WEIGHT: 184 LBS | HEART RATE: 80 BPM

## 2023-10-17 DIAGNOSIS — G89.29 CHRONIC RIGHT SHOULDER PAIN: ICD-10-CM

## 2023-10-17 DIAGNOSIS — K31.84 GASTROPARESIS: ICD-10-CM

## 2023-10-17 DIAGNOSIS — E03.8 HYPOTHYROIDISM DUE TO HASHIMOTO'S THYROIDITIS: ICD-10-CM

## 2023-10-17 DIAGNOSIS — E06.3 HYPOTHYROIDISM DUE TO HASHIMOTO'S THYROIDITIS: ICD-10-CM

## 2023-10-17 DIAGNOSIS — M25.511 CHRONIC RIGHT SHOULDER PAIN: ICD-10-CM

## 2023-10-17 DIAGNOSIS — M54.2 NECK PAIN: Primary | ICD-10-CM

## 2023-10-17 PROCEDURE — 99214 OFFICE O/P EST MOD 30 MIN: CPT

## 2023-10-17 RX ORDER — IBUPROFEN 800 MG/1
TABLET ORAL
COMMUNITY

## 2023-10-17 SDOH — ECONOMIC STABILITY: FOOD INSECURITY: WITHIN THE PAST 12 MONTHS, YOU WORRIED THAT YOUR FOOD WOULD RUN OUT BEFORE YOU GOT MONEY TO BUY MORE.: NEVER TRUE

## 2023-10-17 SDOH — ECONOMIC STABILITY: FOOD INSECURITY: WITHIN THE PAST 12 MONTHS, THE FOOD YOU BOUGHT JUST DIDN'T LAST AND YOU DIDN'T HAVE MONEY TO GET MORE.: NEVER TRUE

## 2023-10-17 SDOH — ECONOMIC STABILITY: INCOME INSECURITY: HOW HARD IS IT FOR YOU TO PAY FOR THE VERY BASICS LIKE FOOD, HOUSING, MEDICAL CARE, AND HEATING?: NOT HARD AT ALL

## 2023-10-17 ASSESSMENT — PATIENT HEALTH QUESTIONNAIRE - PHQ9
8. MOVING OR SPEAKING SO SLOWLY THAT OTHER PEOPLE COULD HAVE NOTICED. OR THE OPPOSITE, BEING SO FIGETY OR RESTLESS THAT YOU HAVE BEEN MOVING AROUND A LOT MORE THAN USUAL: 0
5. POOR APPETITE OR OVEREATING: 0
9. THOUGHTS THAT YOU WOULD BE BETTER OFF DEAD, OR OF HURTING YOURSELF: 0
3. TROUBLE FALLING OR STAYING ASLEEP: 0
10. IF YOU CHECKED OFF ANY PROBLEMS, HOW DIFFICULT HAVE THESE PROBLEMS MADE IT FOR YOU TO DO YOUR WORK, TAKE CARE OF THINGS AT HOME, OR GET ALONG WITH OTHER PEOPLE: 0
4. FEELING TIRED OR HAVING LITTLE ENERGY: 0
2. FEELING DOWN, DEPRESSED OR HOPELESS: 0
7. TROUBLE CONCENTRATING ON THINGS, SUCH AS READING THE NEWSPAPER OR WATCHING TELEVISION: 1
SUM OF ALL RESPONSES TO PHQ QUESTIONS 1-9: 1
SUM OF ALL RESPONSES TO PHQ QUESTIONS 1-9: 1
SUM OF ALL RESPONSES TO PHQ9 QUESTIONS 1 & 2: 0
1. LITTLE INTEREST OR PLEASURE IN DOING THINGS: 0
SUM OF ALL RESPONSES TO PHQ QUESTIONS 1-9: 1
SUM OF ALL RESPONSES TO PHQ QUESTIONS 1-9: 1
6. FEELING BAD ABOUT YOURSELF - OR THAT YOU ARE A FAILURE OR HAVE LET YOURSELF OR YOUR FAMILY DOWN: 0

## 2023-10-17 NOTE — PROGRESS NOTES
Amanda Monroy (:  1975) is a 50 y.o. female,Established patient, here for evaluation of the following chief complaint(s):  6 Month Follow-Up, Congestion, and Cough          Subjective   SUBJECTIVE/OBJECTIVE:  Pt here today for 6 month f/u  VSS    Since last OV pt has had difficulty getting a POC put into motion for management of her chronic neck and right shoulder pain  Ortho is attempting to get CT shoulder completed and insurance initially denied this  Encouraged pt to call ortho to get new order as the previous has  and she is unable to schedule    Pt has done some research on hashimoto's and a possible link to celiac  Pt has since cut out gluten from her diet and feels generally greatly improved in gastroparesis sx  She is requesting eval for celiac sensitivity as she has had significant sx for years r/t diet but has never been formerly dx          Review of Systems   Constitutional:  Negative for activity change, appetite change, chills, fatigue, fever and unexpected weight change. HENT:  Negative for congestion, ear pain and sore throat. Eyes:  Negative for discharge and visual disturbance. Respiratory:  Negative for cough, chest tightness, shortness of breath and wheezing. Cardiovascular:  Negative for chest pain, palpitations and leg swelling. Gastrointestinal:  Negative for abdominal pain, constipation, diarrhea, nausea and vomiting. Genitourinary:  Negative for dysuria and pelvic pain. Musculoskeletal:  Positive for arthralgias and neck pain. Negative for gait problem. Skin:  Negative for color change, rash and wound. Neurological:  Negative for dizziness, seizures, syncope, weakness, light-headedness, numbness and headaches. Psychiatric/Behavioral:  Negative for behavioral problems and confusion. Objective   Physical Exam  Vitals and nursing note reviewed. Constitutional:       General: She is not in acute distress. Appearance: Normal appearance.  She is

## 2023-10-18 ASSESSMENT — ENCOUNTER SYMPTOMS
WHEEZING: 0
SHORTNESS OF BREATH: 0
VOMITING: 0
CONSTIPATION: 0
NAUSEA: 0
SORE THROAT: 0
CHEST TIGHTNESS: 0
EYE DISCHARGE: 0
COLOR CHANGE: 0
ABDOMINAL PAIN: 0
DIARRHEA: 0
COUGH: 0

## 2024-04-03 DIAGNOSIS — E03.9 HYPOTHYROIDISM, UNSPECIFIED TYPE: ICD-10-CM

## 2024-04-03 RX ORDER — LEVOTHYROXINE SODIUM 175 UG/1
TABLET ORAL
Qty: 90 TABLET | Refills: 0 | Status: SHIPPED | OUTPATIENT
Start: 2024-04-03

## 2024-04-03 NOTE — TELEPHONE ENCOUNTER
Brissa Ledezma is calling to request a refill on the following medication(s):    Medication Request:  Requested Prescriptions     Pending Prescriptions Disp Refills    levothyroxine (SYNTHROID) 175 MCG tablet [Pharmacy Med Name: Levothyroxine Sodium Oral Tablet 175 MCG] 90 tablet 0     Sig: TAKE 1 TABLET BY MOUTH EVERY DAY       Last Visit Date (If Applicable):  10/17/2023    Next Visit Date:    Visit date not found  (Due around 4/17/2024)

## 2024-05-11 ENCOUNTER — HOSPITAL ENCOUNTER (OUTPATIENT)
Age: 49
Discharge: HOME | End: 2024-05-11
Payer: COMMERCIAL

## 2024-05-11 DIAGNOSIS — E03.8 HYPOTHYROIDISM DUE TO HASHIMOTO'S THYROIDITIS: ICD-10-CM

## 2024-05-11 DIAGNOSIS — E06.3 HYPOTHYROIDISM DUE TO HASHIMOTO'S THYROIDITIS: ICD-10-CM

## 2024-05-11 DIAGNOSIS — K31.84 GASTROPARESIS: ICD-10-CM

## 2024-05-11 LAB
ALBUMIN SERPL-MCNC: 4.3 G/DL (ref 3.5–5.2)
ALBUMIN/GLOB SERPL: 1 {RATIO} (ref 1–2.5)
ALP SERPL-CCNC: 83 U/L (ref 35–104)
ALT SERPL-CCNC: 14 U/L (ref 10–35)
ANION GAP SERPL CALCULATED.3IONS-SCNC: 10 MMOL/L (ref 9–16)
AST SERPL-CCNC: 24 U/L (ref 10–35)
BASOPHILS # BLD: 0.03 K/UL (ref 0–0.2)
BASOPHILS NFR BLD: 1 % (ref 0–2)
BILIRUB SERPL-MCNC: 0.4 MG/DL (ref 0–1.2)
BUN SERPL-MCNC: 12 MG/DL (ref 6–20)
CALCIUM SERPL-MCNC: 9.2 MG/DL (ref 8.6–10.4)
CHLORIDE SERPL-SCNC: 101 MMOL/L (ref 98–107)
CO2 SERPL-SCNC: 27 MMOL/L (ref 20–31)
CREAT SERPL-MCNC: 1 MG/DL (ref 0.5–0.9)
EOSINOPHIL # BLD: 0.08 K/UL (ref 0–0.44)
EOSINOPHILS RELATIVE PERCENT: 2 % (ref 1–4)
ERYTHROCYTE [DISTWIDTH] IN BLOOD BY AUTOMATED COUNT: 13.4 % (ref 11.8–14.4)
GFR, ESTIMATED: 69 ML/MIN/1.73M2
GLIADIN IGA SER IA-ACNC: NORMAL U/ML
GLIADIN IGG SER IA-ACNC: NORMAL U/ML
GLUCOSE SERPL-MCNC: 87 MG/DL (ref 74–99)
HCT VFR BLD AUTO: 41.7 % (ref 36.3–47.1)
HGB BLD-MCNC: 13 G/DL (ref 11.9–15.1)
IGA SERPL-MCNC: 366 MG/DL (ref 70–400)
IMM GRANULOCYTES # BLD AUTO: 0.03 K/UL (ref 0–0.3)
IMM GRANULOCYTES NFR BLD: 1 %
LYMPHOCYTES NFR BLD: 1.58 K/UL (ref 1.1–3.7)
LYMPHOCYTES RELATIVE PERCENT: 31 % (ref 24–43)
MCH RBC QN AUTO: 28.8 PG (ref 25.2–33.5)
MCHC RBC AUTO-ENTMCNC: 31.2 G/DL (ref 28.4–34.8)
MCV RBC AUTO: 92.3 FL (ref 82.6–102.9)
MONOCYTES NFR BLD: 0.26 K/UL (ref 0.1–1.2)
MONOCYTES NFR BLD: 5 % (ref 3–12)
NEUTROPHILS NFR BLD: 60 % (ref 36–65)
NEUTS SEG NFR BLD: 3.09 K/UL (ref 1.5–8.1)
NRBC BLD-RTO: 0 PER 100 WBC
PLATELET # BLD AUTO: 221 K/UL (ref 138–453)
PMV BLD AUTO: 9 FL (ref 8.1–13.5)
POTASSIUM SERPL-SCNC: 4.6 MMOL/L (ref 3.7–5.3)
PROT SERPL-MCNC: 7.6 G/DL (ref 6.6–8.7)
RBC # BLD AUTO: 4.52 M/UL (ref 3.95–5.11)
SODIUM SERPL-SCNC: 138 MMOL/L (ref 136–145)
T4 FREE SERPL-MCNC: 0.2 NG/DL (ref 0.92–1.68)
TSH SERPL DL<=0.05 MIU/L-ACNC: 259 UIU/ML (ref 0.27–4.2)
TTG IGA SER IA-ACNC: NORMAL U/ML
WBC OTHER # BLD: 5.1 K/UL (ref 3.5–11.3)

## 2024-05-11 PROCEDURE — 36415 COLL VENOUS BLD VENIPUNCTURE: CPT

## 2024-05-11 PROCEDURE — 85025 COMPLETE CBC W/AUTO DIFF WBC: CPT

## 2024-05-11 PROCEDURE — 83516 IMMUNOASSAY NONANTIBODY: CPT

## 2024-05-11 PROCEDURE — 80053 COMPREHEN METABOLIC PANEL: CPT

## 2024-05-11 PROCEDURE — 82784 ASSAY IGA/IGD/IGG/IGM EACH: CPT

## 2024-05-11 PROCEDURE — 84443 ASSAY THYROID STIM HORMONE: CPT

## 2024-05-11 PROCEDURE — 84439 ASSAY OF FREE THYROXINE: CPT

## 2024-05-13 LAB
GLIADIN IGA SER IA-ACNC: 1 U/ML
GLIADIN IGG SER IA-ACNC: 1 U/ML
IGA SERPL-MCNC: 366 MG/DL (ref 70–400)
TTG IGA SER IA-ACNC: 0.9 U/ML

## 2024-05-15 ENCOUNTER — HOSPITAL ENCOUNTER (OUTPATIENT)
Age: 49
Setting detail: OBSERVATION
Discharge: HOME | End: 2024-05-16
Attending: EMERGENCY MEDICINE | Admitting: STUDENT IN AN ORGANIZED HEALTH CARE EDUCATION/TRAINING PROGRAM
Payer: COMMERCIAL

## 2024-05-15 ENCOUNTER — APPOINTMENT (OUTPATIENT)
Dept: GENERAL RADIOLOGY | Age: 49
End: 2024-05-15
Payer: COMMERCIAL

## 2024-05-15 DIAGNOSIS — E06.3 HYPOTHYROIDISM DUE TO HASHIMOTO'S THYROIDITIS: Primary | ICD-10-CM

## 2024-05-15 DIAGNOSIS — E03.8 HYPOTHYROIDISM DUE TO HASHIMOTO'S THYROIDITIS: Primary | ICD-10-CM

## 2024-05-15 LAB
BASOPHILS # BLD: 0.04 K/UL (ref 0–0.2)
BASOPHILS NFR BLD: 1 % (ref 0–2)
EOSINOPHIL # BLD: 0.09 K/UL (ref 0–0.44)
EOSINOPHILS RELATIVE PERCENT: 1 % (ref 1–4)
ERYTHROCYTE [DISTWIDTH] IN BLOOD BY AUTOMATED COUNT: 13.4 % (ref 11.8–14.4)
HCT VFR BLD AUTO: 41.6 % (ref 36.3–47.1)
HGB BLD-MCNC: 13.2 G/DL (ref 11.9–15.1)
IMM GRANULOCYTES # BLD AUTO: 0.03 K/UL (ref 0–0.3)
IMM GRANULOCYTES NFR BLD: 0 %
LYMPHOCYTES NFR BLD: 2.33 K/UL (ref 1.1–3.7)
LYMPHOCYTES RELATIVE PERCENT: 31 % (ref 24–43)
MCH RBC QN AUTO: 29.4 PG (ref 25.2–33.5)
MCHC RBC AUTO-ENTMCNC: 31.7 G/DL (ref 28.4–34.8)
MCV RBC AUTO: 92.7 FL (ref 82.6–102.9)
MONOCYTES NFR BLD: 0.39 K/UL (ref 0.1–1.2)
MONOCYTES NFR BLD: 5 % (ref 3–12)
NEUTROPHILS NFR BLD: 62 % (ref 36–65)
NEUTS SEG NFR BLD: 4.75 K/UL (ref 1.5–8.1)
NRBC BLD-RTO: 0 PER 100 WBC
PLATELET # BLD AUTO: 195 K/UL (ref 138–453)
PMV BLD AUTO: 10.1 FL (ref 8.1–13.5)
RBC # BLD AUTO: 4.49 M/UL (ref 3.95–5.11)
WBC OTHER # BLD: 7.6 K/UL (ref 3.5–11.3)

## 2024-05-15 PROCEDURE — 2580000003 HC RX 258

## 2024-05-15 PROCEDURE — 84484 ASSAY OF TROPONIN QUANT: CPT

## 2024-05-15 PROCEDURE — 99285 EMERGENCY DEPT VISIT HI MDM: CPT

## 2024-05-15 PROCEDURE — 6360000002 HC RX W HCPCS

## 2024-05-15 PROCEDURE — 96375 TX/PRO/DX INJ NEW DRUG ADDON: CPT

## 2024-05-15 PROCEDURE — 80048 BASIC METABOLIC PNL TOTAL CA: CPT

## 2024-05-15 PROCEDURE — 96374 THER/PROPH/DIAG INJ IV PUSH: CPT

## 2024-05-15 PROCEDURE — 71046 X-RAY EXAM CHEST 2 VIEWS: CPT

## 2024-05-15 PROCEDURE — 84439 ASSAY OF FREE THYROXINE: CPT

## 2024-05-15 PROCEDURE — 93005 ELECTROCARDIOGRAM TRACING: CPT | Performed by: EMERGENCY MEDICINE

## 2024-05-15 PROCEDURE — 96361 HYDRATE IV INFUSION ADD-ON: CPT

## 2024-05-15 PROCEDURE — 85025 COMPLETE CBC W/AUTO DIFF WBC: CPT

## 2024-05-15 PROCEDURE — 82550 ASSAY OF CK (CPK): CPT

## 2024-05-15 RX ORDER — PROCHLORPERAZINE EDISYLATE 5 MG/ML
10 INJECTION INTRAMUSCULAR; INTRAVENOUS ONCE
Status: COMPLETED | OUTPATIENT
Start: 2024-05-15 | End: 2024-05-15

## 2024-05-15 RX ORDER — 0.9 % SODIUM CHLORIDE 0.9 %
1000 INTRAVENOUS SOLUTION INTRAVENOUS ONCE
Status: COMPLETED | OUTPATIENT
Start: 2024-05-15 | End: 2024-05-15

## 2024-05-15 RX ORDER — DIPHENHYDRAMINE HYDROCHLORIDE 50 MG/ML
12.5 INJECTION INTRAMUSCULAR; INTRAVENOUS ONCE
Status: COMPLETED | OUTPATIENT
Start: 2024-05-15 | End: 2024-05-15

## 2024-05-15 RX ADMIN — DIPHENHYDRAMINE HYDROCHLORIDE 12.5 MG: 50 INJECTION INTRAMUSCULAR; INTRAVENOUS at 22:27

## 2024-05-15 RX ADMIN — SODIUM CHLORIDE 1000 ML: 9 INJECTION, SOLUTION INTRAVENOUS at 22:27

## 2024-05-15 RX ADMIN — PROCHLORPERAZINE EDISYLATE 10 MG: 5 INJECTION INTRAMUSCULAR; INTRAVENOUS at 22:27

## 2024-05-15 ASSESSMENT — PAIN - FUNCTIONAL ASSESSMENT: PAIN_FUNCTIONAL_ASSESSMENT: NONE - DENIES PAIN

## 2024-05-16 ENCOUNTER — TELEPHONE (OUTPATIENT)
Dept: FAMILY MEDICINE CLINIC | Age: 49
End: 2024-05-16

## 2024-05-16 VITALS
DIASTOLIC BLOOD PRESSURE: 82 MMHG | TEMPERATURE: 97.9 F | OXYGEN SATURATION: 97 % | HEART RATE: 70 BPM | BODY MASS INDEX: 30.73 KG/M2 | WEIGHT: 180 LBS | HEIGHT: 64 IN | RESPIRATION RATE: 15 BRPM | SYSTOLIC BLOOD PRESSURE: 114 MMHG

## 2024-05-16 PROBLEM — E03.9 HYPOTHYROID: Status: ACTIVE | Noted: 2024-05-16

## 2024-05-16 LAB
ANION GAP SERPL CALCULATED.3IONS-SCNC: 10 MMOL/L (ref 9–16)
BACTERIA URNS QL MICRO: NORMAL
BILIRUB UR QL STRIP: NEGATIVE
BUN SERPL-MCNC: 16 MG/DL (ref 6–20)
CALCIUM SERPL-MCNC: 8.2 MG/DL (ref 8.6–10.4)
CASTS #/AREA URNS LPF: NORMAL /LPF (ref 0–8)
CHLORIDE SERPL-SCNC: 108 MMOL/L (ref 98–107)
CK SERPL-CCNC: 184 U/L (ref 26–192)
CLARITY UR: CLEAR
CO2 SERPL-SCNC: 21 MMOL/L (ref 20–31)
COLOR UR: YELLOW
CORTIS SERPL-MCNC: 11.3 UG/DL (ref 2.5–19.5)
CREAT SERPL-MCNC: 0.8 MG/DL (ref 0.5–0.9)
EKG ATRIAL RATE: 63 BPM
EKG P AXIS: 43 DEGREES
EKG P-R INTERVAL: 142 MS
EKG Q-T INTERVAL: 422 MS
EKG QRS DURATION: 72 MS
EKG QTC CALCULATION (BAZETT): 431 MS
EKG R AXIS: 48 DEGREES
EKG T AXIS: 75 DEGREES
EKG VENTRICULAR RATE: 63 BPM
EPI CELLS #/AREA URNS HPF: NORMAL /HPF (ref 0–5)
GFR, ESTIMATED: 88 ML/MIN/1.73M2
GLUCOSE SERPL-MCNC: 107 MG/DL (ref 74–99)
GLUCOSE UR STRIP-MCNC: NEGATIVE MG/DL
HGB UR QL STRIP.AUTO: NEGATIVE
KETONES UR STRIP-MCNC: NEGATIVE MG/DL
LEUKOCYTE ESTERASE UR QL STRIP: NEGATIVE
NITRITE UR QL STRIP: NEGATIVE
PH UR STRIP: 6.5 [PH] (ref 5–8)
POTASSIUM SERPL-SCNC: 3.5 MMOL/L (ref 3.7–5.3)
PROT UR STRIP-MCNC: NEGATIVE MG/DL
RBC #/AREA URNS HPF: NORMAL /HPF (ref 0–4)
SODIUM SERPL-SCNC: 139 MMOL/L (ref 136–145)
SP GR UR STRIP: 1.01 (ref 1–1.03)
T3FREE SERPL-MCNC: 2.8 PG/ML (ref 2–4.4)
T4 FREE SERPL-MCNC: 0.3 NG/DL (ref 0.92–1.68)
T4 FREE SERPL-MCNC: 0.7 NG/DL (ref 0.92–1.68)
TROPONIN I SERPL HS-MCNC: <6 NG/L (ref 0–14)
TSH SERPL DL<=0.05 MIU/L-ACNC: 233 UIU/ML (ref 0.27–4.2)
TSH SERPL DL<=0.05 MIU/L-ACNC: 413 UIU/ML (ref 0.27–4.2)
UROBILINOGEN UR STRIP-ACNC: NORMAL EU/DL (ref 0–1)
WBC #/AREA URNS HPF: NORMAL /HPF (ref 0–5)

## 2024-05-16 PROCEDURE — 84481 FREE ASSAY (FT-3): CPT

## 2024-05-16 PROCEDURE — 94761 N-INVAS EAR/PLS OXIMETRY MLT: CPT

## 2024-05-16 PROCEDURE — G0378 HOSPITAL OBSERVATION PER HR: HCPCS

## 2024-05-16 PROCEDURE — 81001 URINALYSIS AUTO W/SCOPE: CPT

## 2024-05-16 PROCEDURE — 82533 TOTAL CORTISOL: CPT

## 2024-05-16 PROCEDURE — 2500000003 HC RX 250 WO HCPCS: Performed by: NURSE PRACTITIONER

## 2024-05-16 PROCEDURE — 6370000000 HC RX 637 (ALT 250 FOR IP): Performed by: STUDENT IN AN ORGANIZED HEALTH CARE EDUCATION/TRAINING PROGRAM

## 2024-05-16 PROCEDURE — 84439 ASSAY OF FREE THYROXINE: CPT

## 2024-05-16 PROCEDURE — 6360000002 HC RX W HCPCS: Performed by: NURSE PRACTITIONER

## 2024-05-16 PROCEDURE — 84443 ASSAY THYROID STIM HORMONE: CPT

## 2024-05-16 PROCEDURE — 99238 HOSP IP/OBS DSCHRG MGMT 30/<: CPT | Performed by: STUDENT IN AN ORGANIZED HEALTH CARE EDUCATION/TRAINING PROGRAM

## 2024-05-16 RX ORDER — SODIUM CHLORIDE 9 MG/ML
INJECTION, SOLUTION INTRAVENOUS PRN
Status: DISCONTINUED | OUTPATIENT
Start: 2024-05-16 | End: 2024-05-16 | Stop reason: HOSPADM

## 2024-05-16 RX ORDER — LEVOTHYROXINE SODIUM ANHYDROUS 100 UG/5ML
200 INJECTION, POWDER, LYOPHILIZED, FOR SOLUTION INTRAVENOUS ONCE
Status: COMPLETED | OUTPATIENT
Start: 2024-05-16 | End: 2024-05-16

## 2024-05-16 RX ORDER — POTASSIUM CHLORIDE 20 MEQ/1
40 TABLET, EXTENDED RELEASE ORAL PRN
Status: DISCONTINUED | OUTPATIENT
Start: 2024-05-16 | End: 2024-05-16 | Stop reason: HOSPADM

## 2024-05-16 RX ORDER — ONDANSETRON 2 MG/ML
4 INJECTION INTRAMUSCULAR; INTRAVENOUS EVERY 6 HOURS PRN
Status: DISCONTINUED | OUTPATIENT
Start: 2024-05-16 | End: 2024-05-16 | Stop reason: HOSPADM

## 2024-05-16 RX ORDER — LEVOTHYROXINE SODIUM 0.2 MG/1
200 TABLET ORAL DAILY
Qty: 90 TABLET | Refills: 1 | Status: SHIPPED | OUTPATIENT
Start: 2024-05-16

## 2024-05-16 RX ORDER — POTASSIUM CHLORIDE 7.45 MG/ML
10 INJECTION INTRAVENOUS PRN
Status: DISCONTINUED | OUTPATIENT
Start: 2024-05-16 | End: 2024-05-16 | Stop reason: HOSPADM

## 2024-05-16 RX ORDER — LEVOTHYROXINE SODIUM 0.1 MG/1
200 TABLET ORAL DAILY
Status: DISCONTINUED | OUTPATIENT
Start: 2024-05-16 | End: 2024-05-16 | Stop reason: HOSPADM

## 2024-05-16 RX ORDER — SODIUM CHLORIDE 0.9 % (FLUSH) 0.9 %
10 SYRINGE (ML) INJECTION PRN
Status: DISCONTINUED | OUTPATIENT
Start: 2024-05-16 | End: 2024-05-16 | Stop reason: HOSPADM

## 2024-05-16 RX ORDER — POLYETHYLENE GLYCOL 3350 17 G/17G
17 POWDER, FOR SOLUTION ORAL DAILY PRN
Status: DISCONTINUED | OUTPATIENT
Start: 2024-05-16 | End: 2024-05-16 | Stop reason: HOSPADM

## 2024-05-16 RX ORDER — ACETAMINOPHEN 325 MG/1
650 TABLET ORAL EVERY 6 HOURS PRN
Status: DISCONTINUED | OUTPATIENT
Start: 2024-05-16 | End: 2024-05-16 | Stop reason: HOSPADM

## 2024-05-16 RX ORDER — ACETAMINOPHEN 650 MG/1
650 SUPPOSITORY RECTAL EVERY 6 HOURS PRN
Status: DISCONTINUED | OUTPATIENT
Start: 2024-05-16 | End: 2024-05-16 | Stop reason: HOSPADM

## 2024-05-16 RX ORDER — LEVOTHYROXINE SODIUM ANHYDROUS 100 UG/5ML
50 INJECTION, POWDER, LYOPHILIZED, FOR SOLUTION INTRAVENOUS ONCE
Status: DISCONTINUED | OUTPATIENT
Start: 2024-05-16 | End: 2024-05-16

## 2024-05-16 RX ORDER — LEVOTHYROXINE SODIUM 0.03 MG/1
25 TABLET ORAL DAILY
Qty: 90 TABLET | Refills: 1 | Status: SHIPPED | OUTPATIENT
Start: 2024-05-16

## 2024-05-16 RX ORDER — SODIUM CHLORIDE 0.9 % (FLUSH) 0.9 %
5-40 SYRINGE (ML) INJECTION EVERY 12 HOURS SCHEDULED
Status: DISCONTINUED | OUTPATIENT
Start: 2024-05-16 | End: 2024-05-16 | Stop reason: HOSPADM

## 2024-05-16 RX ORDER — SODIUM CHLORIDE 9 MG/ML
5 INJECTION, SOLUTION INTRAMUSCULAR; INTRAVENOUS; SUBCUTANEOUS DAILY
Status: DISCONTINUED | OUTPATIENT
Start: 2024-05-16 | End: 2024-05-16 | Stop reason: HOSPADM

## 2024-05-16 RX ORDER — LIOTHYRONINE SODIUM 10 UG/ML
10 INJECTION, SOLUTION INTRAVENOUS ONCE
Status: COMPLETED | OUTPATIENT
Start: 2024-05-16 | End: 2024-05-16

## 2024-05-16 RX ORDER — SODIUM CHLORIDE 9 MG/ML
5 INJECTION, SOLUTION INTRAMUSCULAR; INTRAVENOUS; SUBCUTANEOUS ONCE
Status: DISCONTINUED | OUTPATIENT
Start: 2024-05-16 | End: 2024-05-16

## 2024-05-16 RX ORDER — ENOXAPARIN SODIUM 100 MG/ML
40 INJECTION SUBCUTANEOUS DAILY
Status: DISCONTINUED | OUTPATIENT
Start: 2024-05-16 | End: 2024-05-16 | Stop reason: HOSPADM

## 2024-05-16 RX ORDER — ONDANSETRON 4 MG/1
4 TABLET, ORALLY DISINTEGRATING ORAL EVERY 8 HOURS PRN
Status: DISCONTINUED | OUTPATIENT
Start: 2024-05-16 | End: 2024-05-16 | Stop reason: HOSPADM

## 2024-05-16 RX ORDER — MAGNESIUM SULFATE 1 G/100ML
1000 INJECTION INTRAVENOUS PRN
Status: DISCONTINUED | OUTPATIENT
Start: 2024-05-16 | End: 2024-05-16 | Stop reason: HOSPADM

## 2024-05-16 RX ADMIN — HYDROCORTISONE SODIUM SUCCINATE 100 MG: 100 INJECTION, POWDER, FOR SOLUTION INTRAMUSCULAR; INTRAVENOUS at 03:33

## 2024-05-16 RX ADMIN — LEVOTHYROXINE SODIUM 200 MCG: 100 TABLET ORAL at 07:47

## 2024-05-16 RX ADMIN — LEVOTHYROXINE SODIUM ANHYDROUS 200 MCG: 100 INJECTION, POWDER, LYOPHILIZED, FOR SOLUTION INTRAVENOUS at 03:30

## 2024-05-16 RX ADMIN — SERTRALINE 100 MG: 50 TABLET, FILM COATED ORAL at 07:48

## 2024-05-16 RX ADMIN — LIOTHYRONINE SODIUM 10 MCG: 10 INJECTION, SOLUTION INTRAVENOUS at 03:38

## 2024-05-16 ASSESSMENT — ENCOUNTER SYMPTOMS
ABDOMINAL PAIN: 0
COUGH: 0
VOMITING: 0
SHORTNESS OF BREATH: 1
NAUSEA: 0

## 2024-05-16 NOTE — ED PROVIDER NOTES
Faculty Sign-Out Attestation  Handoff taken on the following patient from prior Attending Physician: Marisel  Note Started: 12:10 AM EDT    I was available and discussed any additional care issues that arose and coordinated the management plans with the resident(s) caring for the patient during my duty period. Any areas of disagreement with resident’s documentation of care or procedures are noted on the chart. I was personally present for the key portions of any/all procedures during my duty period. I have documented in the chart those procedures where I was not present during the key portions.    Vertigo / thyroid issue,   Repeating lab / probable admit for hypothyroid,     Admit hypothyroid     Catrachito Hernandez,   05/16/24 8813

## 2024-05-16 NOTE — H&P
[facility]  Office: [phone]  Cristian Mena DO, Lui Mondragon DO, Perry Goodman DO, Mac Pete DO, Alexander Catalan MD, Bernadette Velasquez MD, Cherie Marie MD, Callie Marcelo MD,  Quinten Stewart MD, Shorty Shoemaker MD, Kellie Feliciano MD,  Moiz Sears DO, Yogesh Haney MD, Rojas Guido MD, Micheal Mena DO, Norma Alvarado MD,  Ruben Palacios DO, Lore Osborn MD, Karen Rosas MD, Katiana Church MD, Freddy Barroso MD,  Nayan Garcia MD, Praveena Kaur MD, Rodolfo Olmos MD, Demar Levy MD, Harry Jorge MD, Марина Sanford MD, James Aiken DO, Jack Andrea DO, Gwen Mcmanus MD,  Charanjit Peterson MD, Shirley Waterhouse, CNP,  Hanny Tay CNP, Booker Sauer, CNP,  Diana Bennett, DNP, Marlyn Dimas, CNP, Jossy Wing, CNP, Evelin Nunn CNP, Lynn Rubin, CNP, Deepika Kohli, PA-C, Ijeoma Thornton PA-C, Nga Ferrer, CNP, Allyson Augustine, CNP, Roxy Ott, CNP, Maggy Son, CNP, Loida Dorsey, CNP, Juliet Garcia, CNS, Maddison Burnette, CNP, Jennifer Ramos CNP, Tracy Schwab, CNP         [facility]   IN-PATIENT SERVICE   [facility]    HISTORY AND PHYSICAL EXAMINATION            Date:   5/16/2024  Patient name:  Brissa Ledezma  Date of admission:  5/15/2024  9:43 PM  MRN:   [id number]  Account:  [id number]  YOB: 1975  PCP:    Karma Fuller APRN - CNP  Room:   02/02  Code Status:    Full Code      History Obtained From:     patient    History of Present Illness:     Brissa Ledezma is a 48 y.o. female with a past medical history of Hashimoto's thyroiditis (s/p thyroidectomy) who presented to the emergency department on 5/15/2024 per the instruction of her PCP for symptomatic hypothyroidism. The patient states that she had outpatient labs drawn yesterday and was instructed to go to the emergency department after being found to be hypothyroid with a free T4 of 0.3 and TSH of 413. The patient states that she has felt dizzy and fatigued over  the past several weeks but is otherwise at baseline. In the ED, the patient was afebrile and nontoxic appearing. The patient was given IV levothyroxine and her repeat T4 was 0.7. TSH trended down to 233. The patient was alert, oriented and hemodynamically stable throughout her stay in the ED. She was not in myxedema coma. The patient was subsequently admitted to internal medicine for further management of symptomatic hypothyroidism. Her symptoms improved during her observation admission. The patient is discharged home today (5/16) in stable condition. She is instructed to increase her home dose of levothyroxine to 225 mcg daily and follow-up with her primary care physician and endocrinologist of her choosing.     Past Medical History:     Past Medical History:   Diagnosis Date    Allergic rhinitis     Anxiety     Depression     Hypothyroid     Irritable bowel syndrome         Past Surgical History:     Past Surgical History:   Procedure Laterality Date    ENDOMETRIAL ABLATION      HYSTERECTOMY (CERVIX STATUS UNKNOWN)      OTHER SURGICAL HISTORY Right 08/09/2016    excision of axillary soft tissue mass with biopsy    THYROID SURGERY      benign mass    WRIST SURGERY      rt wrist tendon repair        Medications Prior to Admission:     Prior to Admission medications    Medication Sig Start Date End Date Taking? Authorizing Provider   levothyroxine (SYNTHROID) 200 MCG tablet Take 1 tablet by mouth daily 5/16/24  Yes Rojas Guido MD   levothyroxine (SYNTHROID) 25 MCG tablet Take 1 tablet by mouth daily 5/16/24  Yes Rojas Guido MD   ibuprofen (ADVIL;MOTRIN) 800 MG tablet ibuprofen 800 mg tablet   Take 1 tablet 3 times a day by oral route as needed for 10 days.    Una Mazariegos MD   sertraline (ZOLOFT) 100 MG tablet Take 1 tablet by mouth daily 11/2/22 11/2/23  Una Mazariegos MD   Multiple Vitamins-Minerals (THERAPEUTIC MULTIVITAMIN-MINERALS) tablet Take 1 tablet by mouth daily    Provider

## 2024-05-16 NOTE — DISCHARGE INSTRUCTIONS
Increase your dose of levothyroxine to 225 mcg daily. Follow-up with your primary care physician and endocrinologist of your choosing.

## 2024-05-16 NOTE — TELEPHONE ENCOUNTER
She was admitted for an observation stay. They monitored her, no concerns found and she was instructed to continue her current Levothyroxine dose, f/u w/ me and endocrinology. Hospital f/u is needed. This will take time to normalize and we'll need repeat labs for monitoring.

## 2024-05-16 NOTE — ED NOTES
ED to inpatient nurses report      Chief Complaint:  Chief Complaint   Patient presents with    Dizziness     Present to ED from: home    MOA:     LOC: alert and orientated to name, place, date  Mobility: Independent  Oxygen Baseline: room air, mid 90's    Current needs required: none   Pending ED orders: none  Present condition: stable    Why did the patient come to the ED?   Pt presents to the ED ambulatory through triage with c/o dizziness, fatigue, headache, malaise. Pt states she has had a thyroidectomy, on Synthroid. Pt states she informed her PCP of her sx and was advised to present to the ED. Pt A&Ox4, RR even and unlabored. No distress noted. Pt denies chest pain. PIV placed. EKG obtained.     What is the plan?   TBD, hospitalist consulted     Any procedures or intervention occur?   EKG, labs, CXR, UA      Any safety concerns??  None    Mental Status:  Level of Consciousness: Alert (0)    Psych Assessment:      Vital signs   Vitals:    05/16/24 0013 05/16/24 0030 05/16/24 0045 05/16/24 0100   BP:       Pulse: 60 61 63 67   Resp: 13 13 12 14   Temp:       TempSrc:       SpO2: 96% 96% 96% 96%   Weight:       Height:            Vitals:  Patient Vitals for the past 24 hrs:   BP Temp Temp src Pulse Resp SpO2 Height Weight   05/16/24 0100 -- -- -- 67 14 96 % -- --   05/16/24 0045 -- -- -- 63 12 96 % -- --   05/16/24 0030 -- -- -- 61 13 96 % -- --   05/16/24 0013 -- -- -- 60 13 96 % -- --   05/15/24 2345 -- -- -- 64 12 93 % -- --   05/15/24 2330 -- -- -- 62 13 93 % -- --   05/15/24 2300 136/88 -- -- 62 14 95 % -- --   05/15/24 2239 134/77 -- -- -- -- -- -- --   05/15/24 2231 -- -- -- 73 19 94 % -- --   05/15/24 2200 126/80 -- -- 68 14 98 % -- --   05/15/24 2152 -- -- -- 64 12 -- -- --   05/15/24 2149 131/85 97.9 °F (36.6 °C) Oral 70 17 -- -- --   05/15/24 2148 -- -- -- -- -- -- 1.626 m (5' 4\") --   05/15/24 2037 127/82 97.3 °F (36.3 °C) -- 71 16 96 % -- 81.6 kg (180 lb)      Visit Vitals  /88   Pulse 67

## 2024-05-16 NOTE — ED NOTES
The following labs were labeled with appropriate pt sticker and tubed to lab:     [] Blue     [] Lavender   [] on ice  [x] Green/yellow  [] Green/black [] on ice  [] Grey  [] on ice  [] Yellow  [] Red  [] Pink  [] Type/ Screen  [] ABG  [] VBG    [] COVID-19 swab    [] Rapid  [] PCR  [] Flu swab  [] Peds Viral Panel     [] Urine Sample  [] Fecal Sample  [] Pelvic Cultures  [] Blood Cultures  [] X 2  [] STREP Cultures  [] Wound Cultures\

## 2024-05-16 NOTE — ED PROVIDER NOTES
[facility] ED  Emergency Department Encounter  Emergency Medicine Resident     Pt Name:Brissa Ledezma  MRN: [id number]  Birthdate 1975  Date of evaluation: 5/15/24  PCP:  Karma Fuller APRN - CNP  Note Started: 9:53 PM EDT      CHIEF COMPLAINT       Chief Complaint   Patient presents with    Dizziness       HISTORY OF PRESENT ILLNESS  (Location/Symptom, Timing/Onset, Context/Setting, Quality, Duration, Modifying Factors, Severity.)      Brissa Ledezma is a 48 y.o. female with a history of Hashimoto's thyroiditis, thyroidectomy on synthroid, anxiety, depression presents with few week history of fatigue, irritability, weakness.  Associated frontal headache x 1 day.  Headache described as bilateral, throbbing/achy, unrelenting, some lightheadedness, denies any vertiginous symptoms.  Patient had her 3-month thyroid labs drawn on 5/11, was found to have significantly elevated TSH and decreased free T4.  States overall she takes her medications as prescribed, however does miss some doses occasionally.  Her PCP advised her to present to the emergency department due to her symptoms.  She is denying any chest pain, does have some shortness of breath, not worse with exertion.  Denies cough, recent sick contacts.    PAST MEDICAL / SURGICAL / SOCIAL / FAMILY HISTORY      has a past medical history of Allergic rhinitis, Anxiety, Depression, Hypothyroid, and Irritable bowel syndrome.       has a past surgical history that includes Endometrial ablation; Hysterectomy; Thyroid surgery; Wrist surgery; and other surgical history (Right, 08/09/2016).      Social History     Socioeconomic History    Marital status:      Spouse name: Not on file    Number of children: Not on file    Years of education: Not on file    Highest education level: Not on file   Occupational History    Not on file   Tobacco Use    Smoking status: Never    Smokeless tobacco: Never   Vaping Use    Vaping Use: Never used   Substance and    Skin:     General: Skin is warm and dry.      Capillary Refill: Capillary refill takes less than 2 seconds.   Neurological:      General: No focal deficit present.      Mental Status: She is alert and oriented to person, place, and time.      Motor: Weakness present.      Deep Tendon Reflexes:      Reflex Scores:       Patellar reflexes are 2+ on the right side and 2+ on the left side.      DDX/DIAGNOSTIC RESULTS / EMERGENCY DEPARTMENT COURSE / MDM     Medical Decision Making  Patient presents to the emergency department with history of hypothyroidism, on Synthroid.  Recent laboratory work showing elevated TSH and decreased free T4.  Endorsing generalized fatigue, muscle weakness, shortness of breath and headache. Symptoms are likely related to decreased thyroid hormone, will treat headache with IV fluids and medications.  Low threshold for imaging of head if unrelieved with these interventions.  Will obtain repeat thyroid studies, basic lab work and cardiac workup due to shortness of breath.  Patient will likely require admission due to severity of symptoms.    Amount and/or Complexity of Data Reviewed  Labs: ordered.  Radiology: ordered. Decision-making details documented in ED Course.  ECG/medicine tests: ordered.    Risk  Prescription drug management.      EKG  EKG Interpretation    Interpreted by emergency department physician    Rhythm: normal sinus   Rate: normal  Axis: normal  Ectopy: none  Conduction: normal  ST Segments: no acute change  T Waves: no acute change  Q Waves: none    Clinical Impression: no acute changes    Quinten Galo MD      All EKG's are interpreted by the Emergency Department Physician who either signs or Co-signs this chart in the absence of a cardiologist.    EMERGENCY DEPARTMENT COURSE:  ED Course as of 05/16/24 0141   Wed May 15, 2024   2305 XR CHEST (2 VW)  No acute cardiopulmonary process identified [BG]   Thu May 16, 2024   0140 CBC with Auto Differential:    WBC 7.6   RBC

## 2024-05-16 NOTE — ED NOTES
Medications    sodium chloride 0.9 % bolus 1,000 mL    prochlorperazine (COMPAZINE) injection 10 mg    diphenhydrAMINE (BENADRYL) injection 12.5 mg    DISCONTD: levothyroxine (SYNTHROID) injection 50 mcg     Order Specific Question:   Is levothyroxine IV being used to treat myxedema coma?     Answer:   No    DISCONTD: sodium chloride (PF) 0.9 % injection 5 mL    AND Linked Order Group     levothyroxine (SYNTHROID) injection 200 mcg      Order Specific Question:   Is levothyroxine IV being used to treat myxedema coma?      Answer:   Yes     sodium chloride (PF) 0.9 % injection 5 mL    DISCONTD: hydrocortisone sodium succinate PF (SOLU-CORTEF) injection 100 mg    sodium chloride flush 0.9 % injection 5-40 mL    sodium chloride flush 0.9 % injection 10 mL    0.9 % sodium chloride infusion    OR Linked Order Group     potassium chloride (KLOR-CON M) extended release tablet 40 mEq     potassium bicarb-citric acid (EFFER-K) effervescent tablet 40 mEq     potassium chloride 10 mEq/100 mL IVPB (Peripheral Line)    magnesium sulfate 1000 mg in dextrose 5% 100 mL IVPB    enoxaparin (LOVENOX) injection 40 mg     Order Specific Question:   Indication of Use     Answer:   Prophylaxis-DVT/PE    OR Linked Order Group     ondansetron (ZOFRAN-ODT) disintegrating tablet 4 mg     ondansetron (ZOFRAN) injection 4 mg    polyethylene glycol (GLYCOLAX) packet 17 g    OR Linked Order Group     acetaminophen (TYLENOL) tablet 650 mg     acetaminophen (TYLENOL) suppository 650 mg    liothyronine (TRIOSTAT) injection 10 mcg    levothyroxine (SYNTHROID) tablet 200 mcg    sertraline (ZOLOFT) tablet 100 mg    levothyroxine (SYNTHROID) 200 MCG tablet     Sig: Take 1 tablet by mouth daily     Dispense:  90 tablet     Refill:  1    levothyroxine (SYNTHROID) 25 MCG tablet     Sig: Take 1 tablet by mouth daily     Dispense:  90 tablet     Refill:  1       SURGICAL HISTORY       Past Surgical History:   Procedure Laterality Date    ENDOMETRIAL  ABLATION      HYSTERECTOMY (CERVIX STATUS UNKNOWN)      OTHER SURGICAL HISTORY Right 08/09/2016    excision of axillary soft tissue mass with biopsy    THYROID SURGERY      benign mass    WRIST SURGERY      rt wrist tendon repair       PAST MEDICAL HISTORY       Past Medical History:   Diagnosis Date    Allergic rhinitis     Anxiety     Depression     Hypothyroid     Irritable bowel syndrome        Labs:  Labs Reviewed   BASIC METABOLIC PANEL - Abnormal; Notable for the following components:       Result Value    Potassium 3.5 (*)     Chloride 108 (*)     Glucose 107 (*)     Calcium 8.2 (*)     All other components within normal limits   T4, FREE - Abnormal; Notable for the following components:    T4 Free 0.3 (*)     All other components within normal limits   TSH - Abnormal; Notable for the following components:    .00 (*)     All other components within normal limits   TSH - Abnormal; Notable for the following components:    .00 (*)     All other components within normal limits   T4, FREE - Abnormal; Notable for the following components:    T4 Free 0.7 (*)     All other components within normal limits   CBC WITH AUTO DIFFERENTIAL   URINALYSIS WITH MICROSCOPIC   PREVIOUS SPECIMEN   CK   TROPONIN   CORTISOL TOTAL   SPECIMEN REJECTION   T3, FREE       Electronically signed by Nikhil Valladares RN on 5/16/2024 at 9:32 AM

## 2024-05-16 NOTE — ED PROVIDER NOTES
[facility]     Emergency Department     Faculty Attestation    I performed a history and physical examination of the patient and discussed management with the resident. I have reviewed and agree with the resident’s findings including all diagnostic interpretations, and treatment plans as written at the time of my review. Any areas of disagreement are noted on the chart. I was personally present for the key portions of any procedures. I have documented in the chart those procedures where I was not present during the key portions. For Physician Assistant/ Nurse Practitioner cases/documentation I have personally evaluated this patient and have completed at least one if not all key elements of the E/M (history, physical exam, and MDM). Additional findings are as noted.    PtName: Brissa Ledezma  MRN: [id number]  Birthdate 1975  Date of evaluation: 5/15/24  Note Started: 10:27 PM EDT    Primary Care Physician: Karma Fuller APRN - CNP        History: This is a 48 y.o. female who presents to the Emergency Department with complaint of dizziness fatigue.  This has been ongoing for the last month.  Patient was seen by her primary care physician for labs drawn 4 days ago noted to have significant elevated TSH with significantly low T4 free of 0.2.  Patient also states she has been more forgetful with taking her medications.    Physical:   height is 1.626 m (5' 4\") and weight is 81.6 kg (180 lb). Her oral temperature is 97.9 °F (36.6 °C). Her blood pressure is 131/85 and her pulse is 64. Her respiration is 12 and oxygen saturation is 96%.  Lungs are clear to auscultation bilaterally, heart regular rate and rhythm, abdomen is soft nontender patient is awake alert responds appropriately but appears drowsy    Impression: Hypothyroid, fatigue    Plan: IV fluid, EKG, CBC, BMP, troponin, CK, urinalysis          Medical Decision Making  Amount and/or Complexity of Data

## 2024-05-16 NOTE — TELEPHONE ENCOUNTER
Patient calling stating that she was at [facility] over night. They were running tests and the doctor told her they were waiting for a bed and it would be a couple more hour. She states a nurse then came in with discharge papers and told her they needed the bed and she needed to leave. Patient would like to know what to do now

## 2024-05-16 NOTE — ED TRIAGE NOTES
Pt presents to the ED ambulatory through triage with c/o dizziness, fatigue, headache, malaise. Pt states she has had a thyroidectomy, on Synthroid. Pt states she informed her PCP of her sx and was advised to present to the ED. Pt A&Ox4, RR even and unlabored. No distress noted. Pt denies chest pain. PIV placed. EKG obtained. Call light within reach.

## 2024-05-16 NOTE — DISCHARGE SUMMARY
[facility]  Office: [phone]  Cristian Mena DO, Lui Mondragon DO, Perry Goodman DO, Mac Pete DO, Alexander Catalan MD, Bernadette Velasquez MD, Cherie Marie MD, Callie Marcelo MD,  Quinten Stewart MD, Shorty Shoemaker MD, Kellie Feliciano MD,  Moiz Sears DO, Yogesh Haney MD, Rojas Guido MD, Micheal Mena DO, Norma Alvarado MD,  Ruben Palacios DO, Lore Osborn MD, Karen Rosas MD, Katiana Church MD, Freddy Barroso MD,  Nayan Garcia MD, Praveena Kaur MD, Rodolfo Olmos MD, Demar Levy MD, Harry Jorge MD, Марина Sanford MD, James Aiken DO, Jack Andrea DO, Gwen Mcmanus MD,  Charanjit Peterson MD, Shirley Waterhouse, CNP,  Hanny Tay CNP, Booker Sauer, CNP,  Diana Bennett, YOHANA, Marlyn Dimas, CNP, Jossy Wing, CNP, Evelin Nunn CNP, Lynn Rubin, CNP, Deepika Kohli, PA-C, Ijeoma Thornton PA-C, Nga Ferrer, CNP, Allyson Augustine, CNP, Roxy Ott, CNP, Maggy Son, CNP, Loida Dorsey, CNP, Juliet Garcia, CNS, Maddison Burnette, CNP, Jennifer Ramos CNP, Tracy Schwab, CNP         [facility]   IN-PATIENT SERVICE   [facility]    Discharge Summary     Patient ID: Brissa Ledezma  :  1975   MRN: [id number]     ACCOUNT:  [id number]   Patient's PCP: Karma Fuller APRN - CNP  Admit Date: 5/15/2024   Discharge Date: 2024  Length of Stay: 1  Code Status:  Full Code  Admitting Physician: Rojas Guido MD  Discharge Physician: Rojas Guido MD     Active Discharge Diagnoses:     Hospital Problem Lists:  Principal Problem:    Hypothyroid  Active Problems:    Hypothyroidism  Resolved Problems:    * No resolved hospital problems. *      Admission Condition:  good     Discharged Condition: good    Hospital Stay:     Hospital Course:  Brissa Ledezma is a 48 y.o. female with a past medical history of Hashimoto's thyroiditis (s/p thyroidectomy) who presented to the emergency department on 5/15/2024 per the instruction of her PCP for symptomatic

## 2024-05-17 ENCOUNTER — TELEPHONE (OUTPATIENT)
Dept: FAMILY MEDICINE CLINIC | Age: 49
End: 2024-05-17

## 2024-05-17 NOTE — TELEPHONE ENCOUNTER
Care Transitions Initial Follow Up Call    Outreach made within 2 business days of discharge: Yes    Patient: Brissa Ledezma Patient : 1975   MRN: [id number]  Reason for Admission: There are no discharge diagnoses documented for the most recent discharge.  Discharge Date: 24       Spoke with: Brissa    Discharge department/facility: [facility] Interactive Patient Contact:  Was patient able to fill all prescriptions: Yes  Was patient instructed to bring all medications to the follow-up visit: Yes  Is patient taking all medications as directed in the discharge summary? Yes  Does patient understand their discharge instructions: Yes  Does patient have questions or concerns that need addressed prior to 7-14 day follow up office visit: yes - Patient states that she wants to know her next steps after observation    Scheduled appointment with PCP within 7-14 days    Follow Up  Future Appointments   Date Time Provider Department Center   2024  2:40 PM Ruby Blanco APRN - CNP [id number]       Dylan Rosales MA

## 2024-05-19 LAB
EKG ATRIAL RATE: 64 BPM
EKG P AXIS: 49 DEGREES
EKG P-R INTERVAL: 146 MS
EKG Q-T INTERVAL: 396 MS
EKG QRS DURATION: 74 MS
EKG QTC CALCULATION (BAZETT): 408 MS
EKG R AXIS: 62 DEGREES
EKG T AXIS: 105 DEGREES
EKG VENTRICULAR RATE: 64 BPM

## 2025-02-05 ENCOUNTER — OFFICE VISIT (OUTPATIENT)
Dept: FAMILY MEDICINE CLINIC | Age: 50
End: 2025-02-05
Payer: COMMERCIAL

## 2025-02-05 VITALS
OXYGEN SATURATION: 98 % | BODY MASS INDEX: 33.29 KG/M2 | HEIGHT: 64 IN | SYSTOLIC BLOOD PRESSURE: 120 MMHG | HEART RATE: 87 BPM | DIASTOLIC BLOOD PRESSURE: 84 MMHG | WEIGHT: 195 LBS | TEMPERATURE: 97 F

## 2025-02-05 DIAGNOSIS — J40 BRONCHITIS: ICD-10-CM

## 2025-02-05 DIAGNOSIS — K62.5 RECTAL BLEEDING: ICD-10-CM

## 2025-02-05 DIAGNOSIS — Z12.31 ENCOUNTER FOR SCREENING MAMMOGRAM FOR MALIGNANT NEOPLASM OF BREAST: ICD-10-CM

## 2025-02-05 DIAGNOSIS — E66.09 CLASS 1 OBESITY DUE TO EXCESS CALORIES WITHOUT SERIOUS COMORBIDITY WITH BODY MASS INDEX (BMI) OF 33.0 TO 33.9 IN ADULT: ICD-10-CM

## 2025-02-05 DIAGNOSIS — Z76.89 ENCOUNTER TO ESTABLISH CARE: Primary | ICD-10-CM

## 2025-02-05 DIAGNOSIS — J32.9 RHINOSINUSITIS: ICD-10-CM

## 2025-02-05 DIAGNOSIS — Z12.11 COLON CANCER SCREENING: ICD-10-CM

## 2025-02-05 DIAGNOSIS — E66.811 CLASS 1 OBESITY DUE TO EXCESS CALORIES WITHOUT SERIOUS COMORBIDITY WITH BODY MASS INDEX (BMI) OF 33.0 TO 33.9 IN ADULT: ICD-10-CM

## 2025-02-05 DIAGNOSIS — R05.1 ACUTE COUGH: ICD-10-CM

## 2025-02-05 DIAGNOSIS — E06.3 HYPOTHYROIDISM DUE TO HASHIMOTO'S THYROIDITIS: ICD-10-CM

## 2025-02-05 DIAGNOSIS — R09.81 SINUS CONGESTION: ICD-10-CM

## 2025-02-05 PROBLEM — F34.1 DYSTHYMIA: Status: ACTIVE | Noted: 2025-02-05

## 2025-02-05 PROBLEM — F43.12 POST-TRAUMATIC STRESS DISORDER, CHRONIC: Status: ACTIVE | Noted: 2022-06-30

## 2025-02-05 PROBLEM — N39.3 STRESS INCONTINENCE: Status: ACTIVE | Noted: 2018-06-12

## 2025-02-05 PROBLEM — F41.9 ANXIETY: Status: ACTIVE | Noted: 2025-02-05

## 2025-02-05 PROBLEM — E05.80 HYPERTHYROIDISM WITH HASHIMOTO DISEASE: Status: ACTIVE | Noted: 2022-03-08

## 2025-02-05 PROBLEM — T79.6XXA TRAUMATIC RHABDOMYOLYSIS (HCC): Status: RESOLVED | Noted: 2025-02-05 | Resolved: 2025-02-05

## 2025-02-05 PROBLEM — E78.00 PURE HYPERCHOLESTEROLEMIA: Status: ACTIVE | Noted: 2025-02-05

## 2025-02-05 PROBLEM — G47.9 RESTLESS SLEEPER: Status: RESOLVED | Noted: 2025-02-05 | Resolved: 2025-02-05

## 2025-02-05 PROBLEM — T79.6XXA TRAUMATIC RHABDOMYOLYSIS (HCC): Status: ACTIVE | Noted: 2025-02-05

## 2025-02-05 PROBLEM — G47.9 RESTLESS SLEEPER: Status: ACTIVE | Noted: 2025-02-05

## 2025-02-05 PROBLEM — F41.0 PANIC DISORDER WITHOUT AGORAPHOBIA: Status: ACTIVE | Noted: 2022-06-30

## 2025-02-05 PROBLEM — M75.81 TENDINITIS OF RIGHT ROTATOR CUFF: Status: ACTIVE | Noted: 2022-03-08

## 2025-02-05 PROCEDURE — 99214 OFFICE O/P EST MOD 30 MIN: CPT | Performed by: FAMILY MEDICINE

## 2025-02-05 RX ORDER — FLUTICASONE PROPIONATE 50 MCG
2 SPRAY, SUSPENSION (ML) NASAL DAILY
Qty: 16 G | Refills: 0 | Status: SHIPPED | OUTPATIENT
Start: 2025-02-05

## 2025-02-05 RX ORDER — BENZONATATE 200 MG/1
200 CAPSULE ORAL 3 TIMES DAILY PRN
Qty: 30 CAPSULE | Refills: 0 | Status: SHIPPED | OUTPATIENT
Start: 2025-02-05 | End: 2025-02-15

## 2025-02-05 RX ORDER — SERTRALINE HYDROCHLORIDE 100 MG/1
200 TABLET, FILM COATED ORAL EVERY MORNING
COMMUNITY
Start: 2024-12-29

## 2025-02-05 RX ORDER — METHYLPREDNISOLONE 4 MG/1
TABLET ORAL
Qty: 1 KIT | Refills: 0 | Status: SHIPPED | OUTPATIENT
Start: 2025-02-05 | End: 2025-02-11

## 2025-02-05 RX ORDER — LEVOTHYROXINE SODIUM 200 UG/1
200 TABLET ORAL DAILY
Qty: 90 TABLET | Refills: 1 | Status: SHIPPED | OUTPATIENT
Start: 2025-02-05

## 2025-02-05 RX ORDER — LEVOTHYROXINE SODIUM 25 UG/1
25 TABLET ORAL DAILY
Qty: 90 TABLET | Refills: 1 | Status: SHIPPED | OUTPATIENT
Start: 2025-02-05

## 2025-02-05 RX ORDER — SULFAMETHOXAZOLE AND TRIMETHOPRIM 800; 160 MG/1; MG/1
1 TABLET ORAL 2 TIMES DAILY
Qty: 20 TABLET | Refills: 0 | Status: SHIPPED | OUTPATIENT
Start: 2025-02-05 | End: 2025-02-15

## 2025-02-05 SDOH — ECONOMIC STABILITY: FOOD INSECURITY: WITHIN THE PAST 12 MONTHS, THE FOOD YOU BOUGHT JUST DIDN'T LAST AND YOU DIDN'T HAVE MONEY TO GET MORE.: NEVER TRUE

## 2025-02-05 SDOH — ECONOMIC STABILITY: FOOD INSECURITY: WITHIN THE PAST 12 MONTHS, YOU WORRIED THAT YOUR FOOD WOULD RUN OUT BEFORE YOU GOT MONEY TO BUY MORE.: NEVER TRUE

## 2025-02-05 ASSESSMENT — PATIENT HEALTH QUESTIONNAIRE - PHQ9
6. FEELING BAD ABOUT YOURSELF - OR THAT YOU ARE A FAILURE OR HAVE LET YOURSELF OR YOUR FAMILY DOWN: NOT AT ALL
SUM OF ALL RESPONSES TO PHQ QUESTIONS 1-9: 2
SUM OF ALL RESPONSES TO PHQ QUESTIONS 1-9: 2
9. THOUGHTS THAT YOU WOULD BE BETTER OFF DEAD, OR OF HURTING YOURSELF: NOT AT ALL
SUM OF ALL RESPONSES TO PHQ9 QUESTIONS 1 & 2: 2
3. TROUBLE FALLING OR STAYING ASLEEP: NOT AT ALL
4. FEELING TIRED OR HAVING LITTLE ENERGY: NOT AT ALL
10. IF YOU CHECKED OFF ANY PROBLEMS, HOW DIFFICULT HAVE THESE PROBLEMS MADE IT FOR YOU TO DO YOUR WORK, TAKE CARE OF THINGS AT HOME, OR GET ALONG WITH OTHER PEOPLE: NOT DIFFICULT AT ALL
2. FEELING DOWN, DEPRESSED OR HOPELESS: MORE THAN HALF THE DAYS
SUM OF ALL RESPONSES TO PHQ QUESTIONS 1-9: 2
5. POOR APPETITE OR OVEREATING: NOT AT ALL
1. LITTLE INTEREST OR PLEASURE IN DOING THINGS: NOT AT ALL
8. MOVING OR SPEAKING SO SLOWLY THAT OTHER PEOPLE COULD HAVE NOTICED. OR THE OPPOSITE, BEING SO FIGETY OR RESTLESS THAT YOU HAVE BEEN MOVING AROUND A LOT MORE THAN USUAL: NOT AT ALL
7. TROUBLE CONCENTRATING ON THINGS, SUCH AS READING THE NEWSPAPER OR WATCHING TELEVISION: NOT AT ALL
SUM OF ALL RESPONSES TO PHQ QUESTIONS 1-9: 2

## 2025-02-05 ASSESSMENT — ENCOUNTER SYMPTOMS
SINUS PRESSURE: 1
COUGH: 1
HEMATOCHEZIA: 1
ALLERGIC/IMMUNOLOGIC NEGATIVE: 1
SINUS COMPLAINT: 1
EYES NEGATIVE: 1

## 2025-02-05 NOTE — PROGRESS NOTES
[facility]     Date of Visit:  2025  Patient Name: Brissa Ledezma   Patient :  1975     CHIEF COMPLAINT:     Brissa Ledezma is a 49 y.o. female who presents today for an general visit to be evaluated for the following condition(s):    Chief Complaint   Patient presents with    Sinus Problem     Congestion   Started yesterday    Cough     Non-productive, chest feels heavy    Hypothyroidism    Rectal Bleeding     Happens often  Bright red in color    Ear Fullness     bilateral    Establish Care     New to Clinic and Provider - not [facility] Care. Last PCP was KAREN Frias with [facility].       HISTORY OF PRESENT ILLNESS:         Follows with:  Behavioral Health: Pennie Hooks MD       Sinus Problem  Associated symptoms include congestion, coughing, ear pain (fullness) and sinus pressure.   Cough  Associated symptoms include ear pain (fullness).   Rectal Bleeding   Associated symptoms include coughing.   Ear Fullness   Associated symptoms include coughing. Pertinent negatives include no ear discharge.        REVIEW OF SYSTEMS:        Review of Systems   Constitutional: Negative.    HENT:  Positive for congestion, ear pain (fullness) and sinus pressure. Negative for ear discharge.    Eyes: Negative.    Respiratory:  Positive for cough.    Cardiovascular: Negative.    Gastrointestinal:  Positive for hematochezia.   Endocrine: Negative.    Genitourinary: Negative.    Musculoskeletal: Negative.    Skin: Negative.    Allergic/Immunologic: Negative.    Neurological: Negative.    Hematological: Negative.    Psychiatric/Behavioral: Negative.          REVIEWED INFORMATION      Current Outpatient Medications   Medication Sig Dispense Refill    sertraline (ZOLOFT) 100 MG tablet Take 2 tablets by mouth every morning      sulfamethoxazole-trimethoprim (BACTRIM DS;SEPTRA DS) 800-160 MG per tablet Take 1 tablet by mouth 2 times daily for 10 days 20 tablet 0    methylPREDNISolone (MEDROL

## 2025-02-05 NOTE — PROGRESS NOTES
Visit Information    Have you changed or started any medications since your last visit including any over-the-counter medicines, vitamins, or herbal medicines? no   Are you having any side effects from any of your medications? -  no  Have you stopped taking any of your medications? Is so, why? -  no    Have you seen any other physician or provider since your last visit? No  Have you had any other diagnostic tests since your last visit? No  Have you been seen in the emergency room and/or had an admission to a hospital since we last saw you? No  Have you had your routine dental cleaning in the past 6 months? no    Have you activated your [employer] account? If not, what are your barriers? Yes     Patient Care Team:  Yordy Pierson,  as PCP - General (Family Medicine)  Mikayla Marino DO as Consulting Physician (General Surgery)    Medical History Review  Past Medical, Family, and Social History reviewed and  contribute to the patient presenting condition    Health Maintenance   Topic Date Due    Hepatitis B vaccine (1 of 3 - 19+ 3-dose series) Never done    Breast cancer screen  06/11/2018    Colorectal Cancer Screen  Never done    DTaP/Tdap/Td vaccine (2 - Td or Tdap) 05/10/2022    Flu vaccine (1) 08/01/2024    COVID-19 Vaccine (3 - 2023-24 season) 09/01/2024    Depression Monitoring  10/17/2024    Diabetes screen  02/11/2026    Lipids  02/11/2028    Hepatitis A vaccine  Aged Out    Hib vaccine  Aged Out    Polio vaccine  Aged Out    Meningococcal (ACWY) vaccine  Aged Out    Pneumococcal 0-64 years Vaccine  Aged Out    Depression Screen  Discontinued    Hepatitis C screen  Discontinued    HIV screen  Discontinued

## 2025-05-05 ENCOUNTER — RESULTS FOLLOW-UP (OUTPATIENT)
Dept: FAMILY MEDICINE CLINIC | Age: 50
End: 2025-05-05

## 2025-05-05 ENCOUNTER — HOSPITAL ENCOUNTER (OUTPATIENT)
Age: 50
Discharge: HOME | End: 2025-05-05
Payer: COMMERCIAL

## 2025-05-05 DIAGNOSIS — E06.3 HYPOTHYROIDISM DUE TO HASHIMOTO'S THYROIDITIS: ICD-10-CM

## 2025-05-05 LAB
T4 FREE SERPL-MCNC: 0.9 NG/DL (ref 0.92–1.68)
TSH SERPL DL<=0.05 MIU/L-ACNC: 37.5 UIU/ML (ref 0.27–4.2)

## 2025-05-05 PROCEDURE — 36415 COLL VENOUS BLD VENIPUNCTURE: CPT

## 2025-05-05 PROCEDURE — 84439 ASSAY OF FREE THYROXINE: CPT

## 2025-05-05 PROCEDURE — 84443 ASSAY THYROID STIM HORMONE: CPT

## 2025-05-07 ENCOUNTER — OFFICE VISIT (OUTPATIENT)
Dept: FAMILY MEDICINE CLINIC | Age: 50
End: 2025-05-07
Payer: COMMERCIAL

## 2025-05-07 VITALS
WEIGHT: 193.4 LBS | DIASTOLIC BLOOD PRESSURE: 80 MMHG | BODY MASS INDEX: 33.02 KG/M2 | SYSTOLIC BLOOD PRESSURE: 110 MMHG | HEART RATE: 77 BPM | OXYGEN SATURATION: 98 % | HEIGHT: 64 IN

## 2025-05-07 DIAGNOSIS — E06.3 HYPOTHYROIDISM DUE TO HASHIMOTO'S THYROIDITIS: ICD-10-CM

## 2025-05-07 DIAGNOSIS — Z12.31 ENCOUNTER FOR SCREENING MAMMOGRAM FOR MALIGNANT NEOPLASM OF BREAST: ICD-10-CM

## 2025-05-07 DIAGNOSIS — Z00.00 ROUTINE HEALTH MAINTENANCE: Primary | ICD-10-CM

## 2025-05-07 DIAGNOSIS — E55.9 VITAMIN D INSUFFICIENCY: ICD-10-CM

## 2025-05-07 DIAGNOSIS — E66.09 CLASS 1 OBESITY DUE TO EXCESS CALORIES WITH SERIOUS COMORBIDITY AND BODY MASS INDEX (BMI) OF 33.0 TO 33.9 IN ADULT: ICD-10-CM

## 2025-05-07 DIAGNOSIS — M25.50 ARTHRALGIA, UNSPECIFIED JOINT: ICD-10-CM

## 2025-05-07 DIAGNOSIS — F41.1 GENERALIZED ANXIETY DISORDER: ICD-10-CM

## 2025-05-07 DIAGNOSIS — F33.2 SEVERE RECURRENT MAJOR DEPRESSION WITHOUT PSYCHOTIC FEATURES (HCC): ICD-10-CM

## 2025-05-07 DIAGNOSIS — F43.12 POST-TRAUMATIC STRESS DISORDER, CHRONIC: ICD-10-CM

## 2025-05-07 DIAGNOSIS — Z13.1 DIABETES MELLITUS SCREENING: ICD-10-CM

## 2025-05-07 DIAGNOSIS — E66.811 CLASS 1 OBESITY DUE TO EXCESS CALORIES WITH SERIOUS COMORBIDITY AND BODY MASS INDEX (BMI) OF 33.0 TO 33.9 IN ADULT: ICD-10-CM

## 2025-05-07 DIAGNOSIS — E78.00 PURE HYPERCHOLESTEROLEMIA: ICD-10-CM

## 2025-05-07 DIAGNOSIS — Z12.11 COLON CANCER SCREENING: ICD-10-CM

## 2025-05-07 DIAGNOSIS — Z71.89 ENCOUNTER FOR MEDICATION REVIEW AND COUNSELING: ICD-10-CM

## 2025-05-07 PROCEDURE — 99396 PREV VISIT EST AGE 40-64: CPT | Performed by: FAMILY MEDICINE

## 2025-05-07 RX ORDER — LEVOTHYROXINE SODIUM 300 UG/1
300 TABLET ORAL DAILY
Qty: 90 TABLET | Refills: 1 | Status: SHIPPED | OUTPATIENT
Start: 2025-05-07

## 2025-05-07 ASSESSMENT — ENCOUNTER SYMPTOMS
EYES NEGATIVE: 1
RESPIRATORY NEGATIVE: 1
ALLERGIC/IMMUNOLOGIC NEGATIVE: 1
GASTROINTESTINAL NEGATIVE: 1

## 2025-05-07 NOTE — PROGRESS NOTES
[facility]     Date of Visit:  2025  Patient Name: Brissa Ledezma   Patient :  1975     CHIEF COMPLAINT:     Brissa Ledezma is a 49 y.o. female who presents today for an general visit to be evaluated for the following condition(s):    Chief Complaint   Patient presents with    Annual Check Up / Physical     Here for routine health maintenance.  Will order / facilitate here this afternoon.    Follow-up     Here in follow up to establishment visit here with me on 2025.    Generalized Body Aches       HISTORY OF PRESENT ILLNESS:         Follows with:  Behavioral Health: Pennie Hooks MD     Available medical records / info reviewed today.  Chart updated this afternoon.    Thyroid Problem  Presents for follow-up visit. The symptoms have been improving.    Recent labs reviewed.      REVIEW OF SYSTEMS:        Review of Systems   Constitutional: Negative.    HENT: Negative.     Eyes: Negative.    Respiratory: Negative.     Cardiovascular: Negative.    Gastrointestinal: Negative.    Endocrine: Negative.    Genitourinary: Negative.    Musculoskeletal:  Positive for arthralgias.   Skin: Negative.    Allergic/Immunologic: Negative.    Neurological: Negative.    Hematological: Negative.    Psychiatric/Behavioral: Negative.          REVIEWED INFORMATION      Current Outpatient Medications   Medication Sig Dispense Refill    sertraline (ZOLOFT) 100 MG tablet Take 2 tablets by mouth every morning      fluticasone (FLONASE) 50 MCG/ACT nasal spray 2 sprays by Each Nostril route daily 16 g 0    levothyroxine (SYNTHROID) 300 MCG tablet Take 1 tablet by mouth daily 90 tablet 1     No current facility-administered medications for this visit.        Allergies   Allergen Reactions    Percocet [Oxycodone-Acetaminophen]        Patient Active Problem List   Diagnosis    Severe recurrent major depression without psychotic features (HCC)    Generalized anxiety disorder    Hypothyroidism    Panic disorder without